# Patient Record
Sex: MALE | Race: WHITE | NOT HISPANIC OR LATINO | Employment: OTHER | URBAN - METROPOLITAN AREA
[De-identification: names, ages, dates, MRNs, and addresses within clinical notes are randomized per-mention and may not be internally consistent; named-entity substitution may affect disease eponyms.]

---

## 2017-03-07 NOTE — TELEPHONE ENCOUNTER
----- Message from Yoselin Chinchilla sent at 3/7/2017  1:21 PM CST -----  Contact: Marcie  Patient's wife is requesting refill Rx [phone]. Patient has three days left of medication. Thanks!

## 2017-03-09 RX ORDER — ROSUVASTATIN CALCIUM 20 MG/1
20 TABLET, COATED ORAL DAILY
Qty: 90 TABLET | Refills: 0 | Status: SHIPPED | OUTPATIENT
Start: 2017-03-09 | End: 2017-05-15 | Stop reason: SDUPTHER

## 2017-05-10 RX ORDER — POTASSIUM CHLORIDE 750 MG/1
10 CAPSULE, EXTENDED RELEASE ORAL DAILY
Qty: 90 CAPSULE | Refills: 0 | Status: SHIPPED | OUTPATIENT
Start: 2017-05-10 | End: 2017-07-25 | Stop reason: SDUPTHER

## 2017-05-15 RX ORDER — ROSUVASTATIN CALCIUM 20 MG/1
20 TABLET, COATED ORAL DAILY
Qty: 90 TABLET | Refills: 0 | Status: SHIPPED | OUTPATIENT
Start: 2017-05-15 | End: 2017-11-09 | Stop reason: SDUPTHER

## 2017-05-23 RX ORDER — METHOTREXATE 2.5 MG/1
TABLET ORAL
Qty: 40 TABLET | Refills: 5 | Status: SHIPPED | OUTPATIENT
Start: 2017-05-23 | End: 2017-11-09 | Stop reason: SDUPTHER

## 2017-06-07 DIAGNOSIS — M19.90 CHRONIC INFLAMMATORY ARTHRITIS: Primary | ICD-10-CM

## 2017-06-07 RX ORDER — PREDNISONE 5 MG/1
TABLET ORAL
Qty: 90 TABLET | Refills: 1 | Status: SHIPPED | OUTPATIENT
Start: 2017-06-07 | End: 2017-11-09 | Stop reason: SDUPTHER

## 2017-06-13 RX ORDER — CLOPIDOGREL BISULFATE 75 MG/1
TABLET ORAL
COMMUNITY
End: 2017-06-14

## 2017-06-13 RX ORDER — NAPROXEN SODIUM 220 MG/1
TABLET, FILM COATED ORAL
COMMUNITY
End: 2019-05-05

## 2017-06-13 RX ORDER — LISINOPRIL 10 MG/1
TABLET ORAL
COMMUNITY
End: 2017-06-14 | Stop reason: CLARIF

## 2017-06-13 RX ORDER — SILDENAFIL CITRATE 20 MG/1
20 TABLET ORAL 3 TIMES DAILY
Status: ON HOLD | COMMUNITY
End: 2019-05-24 | Stop reason: HOSPADM

## 2017-06-13 RX ORDER — GABAPENTIN 300 MG/1
600 CAPSULE ORAL 3 TIMES DAILY
Status: ON HOLD | COMMUNITY
End: 2019-05-10 | Stop reason: SDUPTHER

## 2017-06-13 RX ORDER — OXYCODONE AND ACETAMINOPHEN 7.5; 325 MG/1; MG/1
TABLET ORAL
COMMUNITY
End: 2018-08-15 | Stop reason: ALTCHOICE

## 2017-06-13 RX ORDER — METOPROLOL SUCCINATE 50 MG/1
25 TABLET, EXTENDED RELEASE ORAL DAILY
COMMUNITY
End: 2018-02-08

## 2017-06-13 RX ORDER — PANTOPRAZOLE SODIUM 40 MG/1
TABLET, DELAYED RELEASE ORAL
Status: ON HOLD | COMMUNITY
End: 2018-11-27 | Stop reason: SDUPTHER

## 2017-06-13 RX ORDER — CYCLOBENZAPRINE HCL 10 MG
TABLET ORAL
COMMUNITY
End: 2018-08-15

## 2017-06-13 RX ORDER — FUROSEMIDE 20 MG/1
TABLET ORAL
COMMUNITY
End: 2017-11-09

## 2017-06-13 RX ORDER — FOLIC ACID 1 MG/1
TABLET ORAL
COMMUNITY
Start: 2014-12-29 | End: 2018-06-14 | Stop reason: SDUPTHER

## 2017-06-13 RX ORDER — AMBRISENTAN 5 MG/1
5 TABLET, FILM COATED ORAL DAILY
Status: ON HOLD | COMMUNITY
End: 2019-05-24 | Stop reason: HOSPADM

## 2017-06-13 RX ORDER — AMLODIPINE BESYLATE 2.5 MG/1
TABLET ORAL
COMMUNITY
End: 2017-06-14 | Stop reason: CLARIF

## 2017-06-14 ENCOUNTER — OFFICE VISIT (OUTPATIENT)
Dept: CARDIOLOGY | Facility: CLINIC | Age: 72
End: 2017-06-14
Payer: MEDICARE

## 2017-06-14 ENCOUNTER — OFFICE VISIT (OUTPATIENT)
Dept: RHEUMATOLOGY | Facility: CLINIC | Age: 72
End: 2017-06-14
Payer: MEDICARE

## 2017-06-14 VITALS
SYSTOLIC BLOOD PRESSURE: 158 MMHG | HEART RATE: 87 BPM | DIASTOLIC BLOOD PRESSURE: 72 MMHG | HEIGHT: 68 IN | WEIGHT: 210 LBS | BODY MASS INDEX: 31.83 KG/M2

## 2017-06-14 VITALS
BODY MASS INDEX: 31.73 KG/M2 | WEIGHT: 209.38 LBS | DIASTOLIC BLOOD PRESSURE: 84 MMHG | SYSTOLIC BLOOD PRESSURE: 152 MMHG | HEIGHT: 68 IN

## 2017-06-14 DIAGNOSIS — I10 ESSENTIAL HYPERTENSION: ICD-10-CM

## 2017-06-14 DIAGNOSIS — H40.9 GLAUCOMA, UNSPECIFIED GLAUCOMA, UNSPECIFIED LATERALITY: Chronic | ICD-10-CM

## 2017-06-14 DIAGNOSIS — Z79.02 LONG TERM (CURRENT) USE OF ANTITHROMBOTICS/ANTIPLATELETS: ICD-10-CM

## 2017-06-14 DIAGNOSIS — I34.0 NON-RHEUMATIC MITRAL REGURGITATION: ICD-10-CM

## 2017-06-14 DIAGNOSIS — I25.708 CORONARY ARTERY DISEASE INVOLVING CORONARY BYPASS GRAFT OF NATIVE HEART WITH OTHER FORMS OF ANGINA PECTORIS: ICD-10-CM

## 2017-06-14 DIAGNOSIS — E78.00 HYPERCHOLESTEROLEMIA: ICD-10-CM

## 2017-06-14 DIAGNOSIS — I27.20 PULMONARY HTN: ICD-10-CM

## 2017-06-14 DIAGNOSIS — R06.02 SOB (SHORTNESS OF BREATH): Primary | ICD-10-CM

## 2017-06-14 DIAGNOSIS — E66.9 OBESITY, CLASS I, BMI 30.0-34.9 (SEE ACTUAL BMI): ICD-10-CM

## 2017-06-14 PROBLEM — I25.810 CORONARY ARTERY DISEASE INVOLVING CORONARY BYPASS GRAFT OF NATIVE HEART: Status: ACTIVE | Noted: 2017-06-14

## 2017-06-14 PROCEDURE — 99214 OFFICE O/P EST MOD 30 MIN: CPT | Mod: S$GLB,,, | Performed by: INTERNAL MEDICINE

## 2017-06-14 PROCEDURE — 1159F MED LIST DOCD IN RCRD: CPT | Mod: S$GLB,,, | Performed by: INTERNAL MEDICINE

## 2017-06-14 PROCEDURE — 1126F AMNT PAIN NOTED NONE PRSNT: CPT | Mod: S$GLB,,, | Performed by: INTERNAL MEDICINE

## 2017-06-14 PROCEDURE — 1126F AMNT PAIN NOTED NONE PRSNT: CPT | Mod: ,,, | Performed by: INTERNAL MEDICINE

## 2017-06-14 PROCEDURE — 1159F MED LIST DOCD IN RCRD: CPT | Mod: ,,, | Performed by: INTERNAL MEDICINE

## 2017-06-14 PROCEDURE — 99212 OFFICE O/P EST SF 10 MIN: CPT | Mod: ,,, | Performed by: INTERNAL MEDICINE

## 2017-06-14 RX ORDER — LISINOPRIL 5 MG/1
TABLET ORAL
Qty: 90 TABLET | Refills: 1 | Status: SHIPPED | OUTPATIENT
Start: 2017-06-14 | End: 2017-11-09

## 2017-06-14 RX ORDER — LISINOPRIL 5 MG/1
5 TABLET ORAL DAILY
Qty: 30 TABLET | Refills: 1 | Status: SHIPPED | OUTPATIENT
Start: 2017-06-14 | End: 2017-06-14 | Stop reason: SDUPTHER

## 2017-06-14 NOTE — PROGRESS NOTES
"    The patient was here today for follow-up of his rheumatoid arthritis.    The patient states that his rheumatoid arthritis is doing quite well He has been taking Xeljanz and feels it has been extremely accessible. It has allowed him to function at a much higher level and require less in the way of oral steroids.    The patient is unusually upset today and both he and his wife are tearful. I inquired as to what problem they had an they informed me that the ophthalmologist he had seen told him that he had glaucoma that could not be controlled and that he will be blind in a relatively short period of time. According to the patient, the ophthalmologist, who is indeed an ophthalmologist's, discharge the patient from his practice because "nothing further can be done."    This story seems not only troubling but unlikely. I therefore left the patient and his wife in the examination room and went to see my colleague, Pete Bustillo M.D. to whom I explained the current situation. Dr. Bustillo also felt that the stories seemed quite unusual and that he would be happy to see the patient today to evaluate the problem.     From the standpoint of his arthritis, the patient is in no distress. He needs additional samples of his Xeljanz but otherwise has adequate amounts of other medications.     I have therefore abbreviated this visit and sent the patient directly to ophthalmology for examination and perhaps treatment today.    I will see the patient back in 1 month and at that time I will do a more in detail exam from the standpoint of his arthritis and I will obtain appropriate blood testing.  "

## 2017-06-14 NOTE — PROGRESS NOTES
Subjective:    Patient ID:  Jameel Villeda is a 72 y.o. male who presents for Coronary Artery Disease; Hyperlipidemia; Valvular Heart Disease; Hypertension; and Pulmonary Hypertension      Coronary Artery Disease   Presents for follow-up visit. Symptoms include shortness of breath. Pertinent negatives include no chest pain, dizziness, leg swelling, palpitations or weight gain. Risk factors include hyperlipidemia. The symptoms have been worsening. Compliance with diet is variable. Compliance with exercise is variable. Compliance with medications is good.   Hyperlipidemia   Associated symptoms include shortness of breath. Pertinent negatives include no chest pain or focal weakness.   Hypertension   Associated symptoms include shortness of breath. Pertinent negatives include no blurred vision, chest pain, malaise/fatigue, orthopnea, palpitations or PND.     PT WAS FOLLOWED BY DR WILLINGHAM, AVAILABLE RECORDS REVIEWED, ECHO 2015, MILD MR, MILD PUL HTN ON LAST CATH, WORSENING SOB , E BLAMES ON EYE DROPS, FOLLOWED BY DR ISH FERGUSON, SEE ROS  Past Medical History:   Diagnosis Date    Acute coronary syndrome     Cancer     skin    Coronary artery disease     Dyspnea     Heart murmur     Hyperlipidemia     Hypertension     Myofascial pain     PHT (pulmonary hypertension)     Polymyalgia rheumatica     Rheumatoid arthritis with rheumatoid factor of multiple sites without organ or systems involvement     Right rib fracture     Valvular regurgitation      Past Surgical History:   Procedure Laterality Date    CARDIAC CATHETERIZATION      Cardiac stents      cataracts Bilateral     CERVICAL SPINE SURGERY      CHOLECYSTECTOMY      CORONARY ANGIOPLASTY      CORONARY ARTERY BYPASS GRAFT  2001    knee replacements Bilateral     LUMBAR SPINE SURGERY       Family History   Problem Relation Age of Onset    Heart disease Mother     Heart disease Father      Social History     Social History    Marital status:       Spouse name: N/A    Number of children: N/A    Years of education: N/A     Social History Main Topics    Smoking status: Former Smoker    Smokeless tobacco: Never Used    Alcohol use No    Drug use: No    Sexual activity: Not Asked     Other Topics Concern    None     Social History Narrative    None       Review of patient's allergies indicates:   Allergen Reactions    Lorazepam     Warfarin        Current Outpatient Prescriptions:     ambrisentan (LETAIRIS) 5 MG Tab, Take by mouth., Disp: , Rfl:     aspirin 81 MG Chew, Take by mouth., Disp: , Rfl:     cyclobenzaprine (FLEXERIL) 10 MG tablet, Take by mouth., Disp: , Rfl:     folic acid (FOLVITE) 1 MG tablet, Take by mouth., Disp: , Rfl:     furosemide (LASIX) 20 MG tablet, Take by mouth., Disp: , Rfl:     gabapentin (NEURONTIN) 300 MG capsule, Take by mouth., Disp: , Rfl:     methotrexate 2.5 MG Tab, TAKE 10 TABLETS BY MOUTH ONCE WEEKLY, Disp: 40 tablet, Rfl: 5    metoprolol succinate (TOPROL-XL) 50 MG 24 hr tablet, Take 25 mg by mouth once daily. , Disp: , Rfl:     oxycodone-acetaminophen (PERCOCET) 7.5-325 mg per tablet, Take by mouth., Disp: , Rfl:     pantoprazole (PROTONIX) 40 MG tablet, Take by mouth., Disp: , Rfl:     potassium chloride (MICRO-K) 10 MEQ CpSR, Take 1 capsule (10 mEq total) by mouth once daily., Disp: 90 capsule, Rfl: 0    predniSONE (DELTASONE) 5 MG tablet, TAKE 1 TABLET BY MOUTH THREE TIMES DAILY, Disp: 90 tablet, Rfl: 1    rosuvastatin (CRESTOR) 20 MG tablet, Take 1 tablet (20 mg total) by mouth once daily., Disp: 90 tablet, Rfl: 0    sildenafil (REVATIO) 20 mg Tab, Take by mouth., Disp: , Rfl:     tofacitinib (XELJANZ) 5 mg Tab, Take by mouth., Disp: , Rfl:     amlodipine (NORVASC) 2.5 MG tablet, Take by mouth., Disp: , Rfl:     lisinopril 10 MG tablet, Take by mouth., Disp: , Rfl:     Review of Systems   Constitution: Negative for chills, diaphoresis, weakness, malaise/fatigue, night sweats and weight gain.  "  HENT: Positive for hearing loss. Negative for congestion and nosebleeds.    Eyes: Negative for blurred vision, discharge, double vision and visual disturbance.   Cardiovascular: Positive for dyspnea on exertion. Negative for chest pain, claudication, cyanosis, irregular heartbeat, leg swelling, near-syncope, orthopnea, palpitations, paroxysmal nocturnal dyspnea and syncope.   Respiratory: Positive for shortness of breath. Negative for cough, hemoptysis, sleep disturbances due to breathing, sputum production and wheezing.    Endocrine: Negative for polyuria. Heat intolerance: ALL HIS LIFE.   Hematologic/Lymphatic: Negative for adenopathy and bleeding problem. Bruises/bleeds easily.   Skin: Negative for color change, itching and nail changes.   Musculoskeletal: Positive for arthritis. Negative for back pain and falls. Joint pain: KNEES.   Gastrointestinal: Negative for bloating, abdominal pain, change in bowel habit, dysphagia, flatus, heartburn, hematemesis, jaundice, melena and vomiting.   Genitourinary: Negative for dysuria, flank pain, frequency, hematuria and nocturia.   Neurological: Positive for loss of balance (MILD). Negative for brief paralysis, difficulty with concentration, disturbances in coordination, dizziness, focal weakness, light-headedness, numbness, paresthesias, seizures, sensory change and tremors.   Psychiatric/Behavioral: Negative for altered mental status, depression, memory loss and substance abuse. The patient is not nervous/anxious.    Allergic/Immunologic: Negative for persistent infections.        Objective:      Vitals:    06/14/17 0805   BP: (!) 158/72   Pulse: 87   Weight: 95.3 kg (210 lb)   Height: 5' 8" (1.727 m)   PainSc: 0-No pain     Body mass index is 31.93 kg/m².    Physical Exam   Constitutional: He is oriented to person, place, and time. He appears well-developed and well-nourished. He is active.   OVER WEIGHT   HENT:   Head: Normocephalic and atraumatic.   Mouth/Throat: " Oropharynx is clear and moist and mucous membranes are normal.   Eyes: Conjunctivae and EOM are normal. Pupils are equal, round, and reactive to light.   Neck: Normal range of motion. Neck supple. Normal carotid pulses, no hepatojugular reflux and no JVD present. Carotid bruit is not present. No tracheal deviation, no edema and no erythema present. No thyromegaly present.   Cardiovascular: Normal rate and regular rhythm.   No extrasystoles are present. PMI is not displaced.  Exam reveals no gallop, no distant heart sounds, no friction rub and no midsystolic click.    Murmur heard.  High-pitched holosystolic murmur is present  at the lower left sternal border, apex  Pulses:       Carotid pulses are 2+ on the right side, and 2+ on the left side.       Radial pulses are 2+ on the right side, and 2+ on the left side.        Femoral pulses are 2+ on the right side, and 2+ on the left side.       Popliteal pulses are 2+ on the right side, and 2+ on the left side.        Dorsalis pedis pulses are 2+ on the right side, and 2+ on the left side.        Posterior tibial pulses are 2+ on the right side, and 2+ on the left side.   Pulmonary/Chest: Effort normal and breath sounds normal. No accessory muscle usage. No tachypnea and no bradypnea. No respiratory distress.   Abdominal: Soft. Bowel sounds are normal. He exhibits no distension and no mass. There is no hepatosplenomegaly. There is no tenderness. There is no CVA tenderness.   Musculoskeletal: Normal range of motion. He exhibits no edema or deformity.   Lymphadenopathy:     He has no cervical adenopathy.   Neurological: He is alert and oriented to person, place, and time. He has normal strength. He displays no tremor. No cranial nerve deficit ([place]). He exhibits normal muscle tone. Coordination normal.   Skin: Skin is warm and dry. No cyanosis or erythema. No pallor.   Psychiatric: He has a normal mood and affect. His speech is normal and behavior is normal. Judgment and  thought content normal.               ..    Chemistry    No results found for: NA, K, CL, CO2, BUN, CREATININE, GLU No results found for: CALCIUM, ALKPHOS, AST, ALT, BILITOT         ..No results found for: CHOL  No results found for: HDL  No results found for: LDLCALC  No results found for: TRIG  No results found for: CHOLHDL  ..No results found for: WBC, HGB, HCT, MCV, PLT    Test(s) Reviewed  I have reviewed the following in detail:  [] Stress test   [] Angiography   [] Echocardiogram   [] Labs   [x] Other:       Assessment:         ICD-10-CM ICD-9-CM   1. SOB (shortness of breath) R06.02 786.05   2. Coronary artery disease involving coronary bypass graft of native heart with other forms of angina pectoris I25.708 414.05     413.9   3. Pulmonary HTN I27.2 416.8   4. Essential hypertension I10 401.9   5. Non-rheumatic mitral regurgitation I34.0 424.0   6. Long term (current) use of antithrombotics/antiplatelets Z79.02 V58.63   7. Obesity, Class I, BMI 30.0-34.9 (see actual BMI) E66.9 278.00   8. Hypercholesterolemia E78.00 272.0     Problem List Items Addressed This Visit        Pulmonary    SOB (shortness of breath) - Primary    Pulmonary HTN    Relevant Orders    2D echo with color flow doppler       Cardiac    Coronary artery disease involving coronary bypass graft of native heart    Essential hypertension    Relevant Orders    Comprehensive metabolic panel    Non-rheumatic mitral regurgitation    Relevant Orders    2D echo with color flow doppler       Fluids/Electrolytes/Nutrition/GI    Obesity, Class I, BMI 30.0-34.9 (see actual BMI)    Hypercholesterolemia    Relevant Orders    Comprehensive metabolic panel    Lipid panel       Other    Long term (current) use of antithrombotics/antiplatelets      Other Visit Diagnoses    None.          Plan:         CHECK ECHO, WATCH BP, HAD CATH 2 YEARS AGO, , ISR OD D1, SMALL VESSEL, DECLINES STRESS TEST, , WATCH BP, DIET, WEIGHT LOSS, ALL OTHER CV CLINICALLY STABLE,  STABLE  ANGINA, NO OVERT HF, HF, NO TIA, NO CLINICAL ARRHYTHMIA,CONTINUE CURRENT MEDS, EDUCATION, DIET, EXERCISE  SOB (shortness of breath)    Coronary artery disease involving coronary bypass graft of native heart with other forms of angina pectoris    Pulmonary HTN  -     2D echo with color flow doppler; Future    Essential hypertension  -     Comprehensive metabolic panel; Future; Expected date: 06/14/2017    Non-rheumatic mitral regurgitation  -     2D echo with color flow doppler; Future    Long term (current) use of antithrombotics/antiplatelets    Obesity, Class I, BMI 30.0-34.9 (see actual BMI)    Hypercholesterolemia  -     Comprehensive metabolic panel; Future; Expected date: 06/14/2017  -     Lipid panel; Future; Expected date: 06/14/2017    RTC Low level/low impact aerobic exercise 5x's/wk. Heart healthy diet and risk factor modification.    See labs and med orders.    Aerobic exercise 5x's/wk. Heart healthy diet and risk factor modification.    See labs and med orders.

## 2017-06-16 NOTE — PATIENT INSTRUCTIONS
Discharge Instructions for Angina  You have been diagnosed with a type of chest pain called angina. Angina occurs when not enough oxygen reaches the heart muscle. It is most often felt under your breastbone, in your left shoulder, or down your left arm. The pain may even spread to your jaw or back. Exercise, increased activity, emotional upset, or stress can trigger this pain. With proper treatment and lifestyle changes to reduce risk factors, most people with angina are able to maintain a full and active life.  Managing risk factors  Your healthcare provider will work with you to make lifestyle changes as needed. This can help prevent worsening of coronary artery disease, which is likely the cause of your angina.  Coronary artery disease is a narrowing of the blood vessels that supply oxygen and nutrients to the heart muscle. The blood vessels can also spasm and reduce the oxygen reaching the heart muscle from the narrowing inside of the artery. Managing your risk factors may prevent both of these causes of narrowing of your arteries.  Diet  Your healthcare provider will give you information on dietary changes that you may need to make, based on your situation. Your provider may recommend that you see a registered dietitian for help with diet changes. Try these changes to start:    · Reduce fat and cholesterol intake   · Reduce sodium (salt) intake, especially if you have high blood pressure   · Increase your intake of fresh vegetables and fruits   · Eat lean proteins, such as fish, poultry, and legumes (beans and peas) and eat less red meat and processed meats  · Use low-fat dairy products   · Use vegetable and nut oils in limited amounts   · Limit sweets and processed foods such as chips, cookies, and baked goods  · Limit sodas and high calorie drinks  · Limit greasy and fried foods, or those high in saturated fat  · Limit alcohol intake  Physical activity  Your healthcare provider may recommend that you  increase your physical activity if you have not been as active as possible. This may include moderate to vigorous intensity physical activity for at least 30 to 60 minutes each day for at least 5 to 7 days per week. A few examples of moderate to vigorous intensity physical activity include:   · Walking at a brisk pace, about 3 to 4 miles per hour  · Jogging or running  · Swimming or water aerobics  · Hiking  · Dancing  · Martial arts  · Tennis  · Riding a bike  Don't start or increase your activity level without first seeing your healthcare provider.  Weight management  If you are overweight, your healthcare provider will work with you to lose weight and lower your BMI (body mass index) to a normal or near-normal level. Making diet changes and increasing physical activity can help. A healthy and reasonable goal for weight loss is to lose 10% of your current weight per year.  Smoking  If you smoke, break the smoking habit. Enroll in a stop-smoking program to improve your chances of success. You can also join a support group. Talk to your healthcare provider about nicotine replacement products or medicines to help you quit.  Stress  Learn ways to manage stress to help you deal with stress in your home and work life. Your ability to prioritize your health depends on your mental health and focus. Feeling supported in the rest of your life is key to achieving success with your health.  Managing medicines  · Keep a record of your episodes of chest pain. Take these with you when you see your healthcare provider.  · Take your medicines exactly as directed. Dont skip doses. If you miss a dose, call your healthcare provider right away.  · If you have unwanted side effects from your medicine, tell your healthcare provider right away.  Taking nitroglycerin  · Keep your nitroglycerin with you at all times.  · If youre on nitroglycerin, dont take medicines used to treat erectile dysfunction (such as sildenafil) at all. These  can react with nitroglycerin and cause your blood pressure to drop to a dangerous or even life-threatening level.  · If you use nitroglycerin to prevent angina attacks, follow your healthcare providers instructions for your kind of nitroglycerin (pill, spray, or skin patch).  · If you use nitroglycerin to stop an angina attack, follow these steps:  ¨ Sit down (you may become dizzy).  ¨ Place 1 tablet under your tongue, or between your lip and gum, or between your cheek and gum. Let the tablet dissolve completely; do not chew or swallow the tablet.  ¨ If you use a spray, then spray once on or under your tongue. Do not inhale. Close your mouth. Wait a few seconds before you swallow and don't rinse your mouth for 5 to 10 minutes.  ¨ After taking 1 tablet or spraying once, continue sitting for 5 minutes.  ¨ If the angina goes away completely, rest awhile and follow your provider's orders about returning to your normal routine.  ¨ If the chest pain or pressure continues, CALL 911 immediately. Do NOT delay. You may be having a heart attack (acute myocardial infarction, or AMI)!  ¨ You may be told by your doctor to CALL 911 after taking 2 or 3 tables or sprays of nitroglycerin (spaced 5 minutes apart) and the chest pain or pressure is still present 5 minutes after the last dose. Do not take more than 3 tablets, or spray more than 3 times, within 15 minutes.   When to call your doctor  Call your doctor immediately if you have any of these:  · Severe headache  · Severe dizziness, or fainting  · Nausea or vomiting  · Fast heartbeat (higher than 100 beats per minute)  · Swollen ankles  · Weakness  · Angina attacks that last longer, occur more often, or are more severe than in the past  · Angina that occurs at rest, wakes you up out of sleep, or does not resolve   Date Last Reviewed: 6/1/2016  © 7775-4511 [employer]. [address] [place] [address], [place] [address]. All rights reserved. This information is not intended  as a substitute for professional medical care. Always follow your healthcare professional's instructions.        Weight Management: Getting Started  Healthy bodies come in all shapes and sizes. Not all bodies are made to be thin. For some people, a healthy weight is higher than the average weight listed on weight charts. Your healthcare provider can help you decide on a healthy weight for you.    Reasons to lose weight  Losing weight can help with some health problems, such as high blood pressure, heart disease, diabetes, sleep apnea, and arthritis. You may also feel more energy.  Set your long-term goal  Your goal doesn't even have to be a specific weight. You may decide on a fitness goal (such as being able to walk 10 miles a week), or a health goal (such as lowering your blood pressure). Choose a goal that is measurable and reasonable, so you know when you've reached it. A goal of reaching a BMI of less than 25 is not always reasonable (or possible).   Make an action plan  Habits dont change overnight. Setting your goals too high can leave you feeling discouraged if you cant reach them. Be realistic. Choose one or two small changes you can make now. Set an action plan for how you are going to make these changes. When you can stick to this plan, keep making a few more small changes. Taking small steps will help you stay on the path to success.  Track your progress  Write down your goals. Then, keep a daily record of your progress. Write down what you eat and how active you are. This record lets you look back on how much youve done. It may also help when youre feeling frustrated. Reward yourself for success. Even if you dont reach every goal, give yourself credit for what you do get done.  Get support  Encouragement from others can help make losing weight easier. Ask your family members and friends for support. They may even want to join you. Also look to your healthcare provider, registered dietitian, and   for help. Your local hospital can give you more information about nutrition, exercise, and weight loss.  Date Last Reviewed: 1/31/2016 © 2000-2016 [employer]. [address] [place] [address], [place] [address]. All rights reserved. This information is not intended as a substitute for professional medical care. Always follow your healthcare professional's instructions.        Heart Valve Problems  Your hearts job is to pump blood through your body. That job starts with pumping blood through the heart itself. Inside your heart, blood passes through a series of one-way caputo called valves. If a valve works poorly, not enough blood moves forward. A problem heart valve may not open wide enough, not close tightly enough, or both. In any case, not enough blood is sent to the heart muscle or out to the body.  Symptoms of heart valve problems  You can have a problem valve for decades yet have no symptoms. If you do have symptoms, they may come on so slowly that you barely notice them. In other cases, though, symptoms appear suddenly. You might have one or more of these symptoms:  · Problems breathing when you lie down, exert yourself, or get stressed emotionally  · Pain, pressure, tightness, or numbness in your chest, neck, back, or arms (angina)  · Feeling dizzy, faint, or lightheaded  · Tiredness, especially with activity or as the day goes on  · Waking up at night coughing or short of breath  · A fast, pounding, or irregular heartbeat  · A fluttering feeling in your chest  · Swollen ankles or feet  · Fainting, especially upon standing up or with exertion  Problems opening (stenosis)    When a valve doesnt open all the way, the problem is called stenosis. The leaflets may be stuck together or too stiff to open fully. When the valve doesnt open fully, blood has to flow through a smaller opening. So the heart muscle has to work harder to push the blood through the valve.  Problems closing  (regurgitation)    When a valve doesnt close tightly enough and blood leaks backward through the valve, the problem is called regurgitation or insufficiency. The valve itself may be described as leaky. Leaflets may fit together poorly. Or the structures that support them may be torn. Some blood leaks through the valve back into the chamber it just left. So the heart has to move that blood twice. This can result in heart muscle damage.  Common causes of valve problems  People of any age can have heart valve trouble. You may have been born with a problem valve. Or a valve may have worn out as youve aged. It may not be possible to pinpoint what caused your valve problem. But common causes include:  · Buildup of calcium or scar tissue on a valve  · Rheumatic fever and certain other infections and diseases  · High blood pressure  · Other heart problems, such as coronary artery disease  · Congenital defects of the heart valves      Date Last Reviewed: 6/1/2016  © 6297-7625 [employer]. [address] [place] [address], [place] [address]. All rights reserved. This information is not intended as a substitute for professional medical care. Always follow your healthcare professional's instructions.

## 2017-07-13 ENCOUNTER — OFFICE VISIT (OUTPATIENT)
Dept: RHEUMATOLOGY | Facility: CLINIC | Age: 72
End: 2017-07-13
Payer: MEDICARE

## 2017-07-13 VITALS
HEIGHT: 68 IN | SYSTOLIC BLOOD PRESSURE: 131 MMHG | BODY MASS INDEX: 32.89 KG/M2 | WEIGHT: 217 LBS | DIASTOLIC BLOOD PRESSURE: 74 MMHG

## 2017-07-13 DIAGNOSIS — M19.90 CHRONIC INFLAMMATORY ARTHRITIS: Primary | ICD-10-CM

## 2017-07-13 DIAGNOSIS — Z79.899 ENCOUNTER FOR LONG-TERM (CURRENT) USE OF OTHER MEDICATIONS: ICD-10-CM

## 2017-07-13 DIAGNOSIS — M75.52 SUBACROMIAL BURSITIS OF LEFT SHOULDER JOINT: ICD-10-CM

## 2017-07-13 LAB
ALBUMIN SERPL-MCNC: 4.2 G/DL (ref 3.1–4.7)
ALP SERPL-CCNC: 45 IU/L (ref 40–104)
ALT (SGPT): 41 IU/L (ref 3–33)
AST SERPL-CCNC: 39 IU/L (ref 10–40)
BASOPHILS NFR BLD: 0 K/UL (ref 0–0.2)
BASOPHILS NFR BLD: 0.4 %
BILIRUB SERPL-MCNC: 0.5 MG/DL (ref 0.3–1)
BUN SERPL-MCNC: 23 MG/DL (ref 8–20)
CALCIUM SERPL-MCNC: 9.4 MG/DL (ref 7.7–10.4)
CHLORIDE: 103 MMOL/L (ref 98–110)
CO2 SERPL-SCNC: 22.9 MMOL/L (ref 22.8–31.6)
CREATININE: 1.18 MG/DL (ref 0.6–1.4)
EOSINOPHIL NFR BLD: 0 K/UL (ref 0–0.7)
EOSINOPHIL NFR BLD: 0.4 %
ERYTHROCYTE [DISTWIDTH] IN BLOOD BY AUTOMATED COUNT: 14.7 % (ref 11.7–14.9)
GLUCOSE: 139 MG/DL (ref 70–99)
GRAN #: 6.1 K/UL (ref 1.4–6.5)
GRAN%: 77.9 %
HCT VFR BLD AUTO: 35.5 % (ref 39–55)
HGB BLD-MCNC: 11.4 G/DL (ref 14–16)
IMMATURE GRANS (ABS): 0.2 K/UL (ref 0–1)
IMMATURE GRANULOCYTES: 1.9 %
LYMPH #: 1 K/UL (ref 1.2–3.4)
LYMPH%: 12.5 %
MCH RBC QN AUTO: 33.5 PG (ref 25–35)
MCHC RBC AUTO-ENTMCNC: 32.1 G/DL (ref 31–36)
MCV RBC AUTO: 104.4 FL (ref 80–100)
MONO #: 0.5 K/UL (ref 0.1–0.6)
MONO%: 6.9 %
NUCLEATED RBCS: 0 %
PLATELET # BLD AUTO: 202 K/UL (ref 140–440)
PMV BLD AUTO: 10.6 FL (ref 8.8–12.7)
POTASSIUM SERPL-SCNC: 4.4 MMOL/L (ref 3.5–5)
PROT SERPL-MCNC: 7.1 G/DL (ref 6–8.2)
RBC # BLD AUTO: 3.4 M/UL (ref 4.3–5.9)
SODIUM: 137 MMOL/L (ref 134–144)
WBC # BLD AUTO: 7.8 K/UL (ref 5–10)

## 2017-07-13 PROCEDURE — 1125F AMNT PAIN NOTED PAIN PRSNT: CPT | Mod: ,,, | Performed by: INTERNAL MEDICINE

## 2017-07-13 PROCEDURE — 1159F MED LIST DOCD IN RCRD: CPT | Mod: ,,, | Performed by: INTERNAL MEDICINE

## 2017-07-13 PROCEDURE — 99214 OFFICE O/P EST MOD 30 MIN: CPT | Mod: 25,,, | Performed by: INTERNAL MEDICINE

## 2017-07-13 PROCEDURE — 20610 DRAIN/INJ JOINT/BURSA W/O US: CPT | Mod: ,,, | Performed by: INTERNAL MEDICINE

## 2017-07-13 RX ORDER — FLUCONAZOLE 150 MG/1
TABLET ORAL
COMMUNITY
Start: 2017-06-22 | End: 2017-11-09

## 2017-07-13 RX ORDER — OXYCODONE AND ACETAMINOPHEN 10; 325 MG/1; MG/1
TABLET ORAL
COMMUNITY
Start: 2017-06-30 | End: 2017-11-09

## 2017-07-13 RX ORDER — TRIAMCINOLONE ACETONIDE 40 MG/ML
40 INJECTION, SUSPENSION INTRA-ARTICULAR; INTRAMUSCULAR
Status: DISCONTINUED | OUTPATIENT
Start: 2017-07-13 | End: 2017-07-13 | Stop reason: HOSPADM

## 2017-07-13 RX ORDER — LATANOPROST 50 UG/ML
1 SOLUTION/ DROPS OPHTHALMIC DAILY
COMMUNITY
Start: 2017-06-20 | End: 2019-09-04

## 2017-07-13 RX ORDER — TIZANIDINE 4 MG/1
TABLET ORAL
COMMUNITY
Start: 2017-06-14 | End: 2018-08-15

## 2017-07-13 RX ORDER — DEXAMETHASONE SODIUM PHOSPHATE 4 MG/ML
2 INJECTION, SOLUTION INTRA-ARTICULAR; INTRALESIONAL; INTRAMUSCULAR; INTRAVENOUS; SOFT TISSUE
Status: DISCONTINUED | OUTPATIENT
Start: 2017-07-13 | End: 2017-07-13 | Stop reason: HOSPADM

## 2017-07-13 RX ORDER — FUROSEMIDE 20 MG/1
TABLET ORAL
COMMUNITY
Start: 2017-06-23 | End: 2017-12-26 | Stop reason: SDUPTHER

## 2017-07-13 RX ADMIN — DEXAMETHASONE SODIUM PHOSPHATE 2 MG: 4 INJECTION, SOLUTION INTRA-ARTICULAR; INTRALESIONAL; INTRAMUSCULAR; INTRAVENOUS; SOFT TISSUE at 02:07

## 2017-07-13 RX ADMIN — TRIAMCINOLONE ACETONIDE 40 MG: 40 INJECTION, SUSPENSION INTRA-ARTICULAR; INTRAMUSCULAR at 02:07

## 2017-07-13 NOTE — PROCEDURES
Large Joint Aspiration/Injection  Date/Time: 7/13/2017 2:31 PM  Performed by: CARL IVY  Authorized by: CARL IVY     Consent Done?:  Yes (Verbal)  Indications:  Pain  Procedure site marked: Yes    Timeout: Prior to procedure the correct patient, procedure, and site was verified      Location:  Shoulder  Site:  L subacromial bursa  Prep: Patient was prepped and draped in usual sterile fashion    Ultrasonic Guidance for needle placement: No  Needle size:  23 G  Medications:  40 mg triamcinolone acetonide 40 mg/mL; 2 mg dexamethasone 4 mg/mL  Aspirate amount (ml):  0  Patient tolerance:  Patient tolerated the procedure well with no immediate complications

## 2017-07-13 NOTE — PROGRESS NOTES
[facility] RHEUMATOLOGY           Follow-up visit      Subjective:       Patient ID:   NAME: Jameel Villeda : 1945     72 y.o. male    Referring Doc: No ref. provider found  Other Physicians:    Chief Complaint:  Rheumatoid Arthritis (follow up, ) and Pain (sholder pain)      HPI/Interval History:  His arthritis has been doing relatively well until last weekend when a good bit of dating had to be done in his yard area in now has pain in the left shoulder and cannot raise the arm or sleep on that side. He has requested an injection.          ROS:   GEN: no fever, night sweats or weight loss  SKIN:  no rashes , erythema, bruising, or swelling, no Raynauds, no photosensitivity  HEENT: no HAs, no changes in vision, no mouth ulcers, no sicca symptoms, no scalp tenderness, jaw claudication.  CV: no CP, SOB, PND, HO or orthopnea,no palpitations  PULM: no SOB, cough, hemoptysis, sputum or pleuritic pain  GI: no abdominal pain, nausea, vomiting, constipation, diarrhea, melanotic stools, BRBPR, or hematemesis.no dysphagia  : no hematuria, dysuria  NEURO:no paresthesias, headaches, visual disturbances, muscle weakness  MUSCULOSKELETAL:  Pain in left shoulder.  PSYCH: No insomnia, no significant depression, no anxiety    Medications:    Current Outpatient Prescriptions:     ambrisentan (LETAIRIS) 5 MG Tab, Take by mouth., Disp: , Rfl:     aspirin 81 MG Chew, Take by mouth., Disp: , Rfl:     cyclobenzaprine (FLEXERIL) 10 MG tablet, Take by mouth., Disp: , Rfl:     fluconazole (DIFLUCAN) 150 MG Tab, Take 1 Tablet (150 mg) by mouth daily., Disp: , Rfl:     folic acid (FOLVITE) 1 MG tablet, Take by mouth., Disp: , Rfl:     furosemide (LASIX) 20 MG tablet, Take by mouth., Disp: , Rfl:     furosemide (LASIX) 20 MG tablet, Take 1 Tablet (20 mg) by mouth 2 times daily Take 20 mg by mouth 2 times daily for 7 days., Disp: , Rfl:     gabapentin (NEURONTIN) 300 MG capsule, Take by mouth., Disp: , Rfl:     latanoprost  "0.005 % ophthalmic solution, , Disp: , Rfl:     lisinopril (PRINIVIL,ZESTRIL) 5 MG tablet, TAKE 1 TABLET BY MOUTH EVERY DAY, Disp: 90 tablet, Rfl: 1    methotrexate 2.5 MG Tab, TAKE 10 TABLETS BY MOUTH ONCE WEEKLY, Disp: 40 tablet, Rfl: 5    metoprolol succinate (TOPROL-XL) 50 MG 24 hr tablet, Take 25 mg by mouth once daily. , Disp: , Rfl:     oxycodone-acetaminophen (PERCOCET)  mg per tablet, , Disp: , Rfl:     oxycodone-acetaminophen (PERCOCET) 7.5-325 mg per tablet, Take by mouth., Disp: , Rfl:     pantoprazole (PROTONIX) 40 MG tablet, Take by mouth., Disp: , Rfl:     potassium chloride (MICRO-K) 10 MEQ CpSR, Take 1 capsule (10 mEq total) by mouth once daily., Disp: 90 capsule, Rfl: 0    predniSONE (DELTASONE) 5 MG tablet, TAKE 1 TABLET BY MOUTH THREE TIMES DAILY, Disp: 90 tablet, Rfl: 1    rosuvastatin (CRESTOR) 20 MG tablet, Take 1 tablet (20 mg total) by mouth once daily., Disp: 90 tablet, Rfl: 0    sildenafil (REVATIO) 20 mg Tab, Take by mouth., Disp: , Rfl:     tizanidine (ZANAFLEX) 4 MG tablet, , Disp: , Rfl:     tofacitinib (XELJANZ) 5 mg Tab, Take by mouth., Disp: , Rfl:     fluconazole (DIFLUCAN) 150 MG Tab, , Disp: , Rfl:   FAMILY HISTORY: negative for Connective Tissue Disease        Review of patient's allergies indicates:   Allergen Reactions    Lorazepam     Warfarin              Objective:     Vitals:  Blood pressure 131/74, height 5' 8" (1.727 m), weight 98.4 kg (217 lb).    Physical Examination:   GEN: wn/wd male in no apparent distress; AAOx3  SKIN: no rashes, no lesions, no sclerodactyly, no Raynaud's, no periungual erythema  HEAD: no alopecia, no scalp tenderness, no temporal artery tenderness or induration.  EYES: no pallor, no icterus, PERRLA  ENT:  no thrush, no mucosal dryness or ulcerations, adequate oral hygiene & dentition.  NECK: supple x 6, no masses, no thyromegaly, no lymphadenopathy.  CV:   S1 and S2 regular, no murmurs, gallop or rubs  CHEST: Normal " respiratory effort;  normal breath sounds/no adventitious sounds. No signs of consolidation.  ABD: non-tender and non-distended; soft; normal bowel sounds; no rebound/guarding or tenderness. No hepatosplenomegaly.  Musculoskeletal:  Chronic synovial proliferation is unchanged. Active abduction of the left shoulder is limited to 80. Passive range of motion is 130. The drop arm sign and the impingement sign are both negative.  EXTREM: no clubbing, cyanosis or edema. normal pulses.  NEURO: grossly intact; motor/sensory WNL; AAOx3; no tremors  PSYCH: normal mood, affect and behavior            Labs:   No results found for: WBC, HGB, HCT, MCV, PLTCMP@  No results found for: NA, K, CL, CO2, GLU, BUN, CREATININE, CALCIUM, PROT, ALBUMIN, BILITOT, ALKPHOS, AST, ALT, CPK, CRP, MARSHA, RF, URICACID      Radiology/Diagnostic Studies:    No x-rays are available.    Assessment/Discussion/Plan:   72 y.o. male with  Rheumatoid arthritis and left subacromial bursitis.        PLAN:  The patient requested a steroid injection in the left shoulder which was performed as per the appended procedure note. The patient tolerated the procedure well and had excellent return of pain free range of motion through 110. Routine blood testing was obtained.      RTC: I will see him back in 4 months.      Electronically signed by Milan Bernabe MD

## 2017-07-25 RX ORDER — POTASSIUM CHLORIDE 750 MG/1
CAPSULE, EXTENDED RELEASE ORAL
Qty: 90 CAPSULE | Refills: 0 | Status: SHIPPED | OUTPATIENT
Start: 2017-07-25 | End: 2017-11-12 | Stop reason: SDUPTHER

## 2017-07-28 ENCOUNTER — CLINICAL SUPPORT (OUTPATIENT)
Dept: CARDIOLOGY | Facility: CLINIC | Age: 72
End: 2017-07-28
Payer: MEDICARE

## 2017-07-28 ENCOUNTER — LAB VISIT (OUTPATIENT)
Dept: LAB | Facility: HOSPITAL | Age: 72
End: 2017-07-28
Attending: INTERNAL MEDICINE
Payer: MEDICARE

## 2017-07-28 DIAGNOSIS — I10 ESSENTIAL HYPERTENSION: ICD-10-CM

## 2017-07-28 DIAGNOSIS — I34.0 NON-RHEUMATIC MITRAL REGURGITATION: ICD-10-CM

## 2017-07-28 DIAGNOSIS — E78.00 HYPERCHOLESTEROLEMIA: ICD-10-CM

## 2017-07-28 DIAGNOSIS — R06.02 SOB (SHORTNESS OF BREATH): ICD-10-CM

## 2017-07-28 DIAGNOSIS — I27.20 PULMONARY HTN: ICD-10-CM

## 2017-07-28 LAB
ALBUMIN SERPL BCP-MCNC: 3.9 G/DL
ALP SERPL-CCNC: 52 U/L
ALT SERPL W/O P-5'-P-CCNC: 43 U/L
ANION GAP SERPL CALC-SCNC: 9 MMOL/L
AST SERPL-CCNC: 36 U/L
BILIRUB SERPL-MCNC: 0.6 MG/DL
BNP SERPL-MCNC: 39 PG/ML
BUN SERPL-MCNC: 28 MG/DL
CALCIUM SERPL-MCNC: 10 MG/DL
CHLORIDE SERPL-SCNC: 105 MMOL/L
CHOLEST/HDLC SERPL: 2.6 {RATIO}
CO2 SERPL-SCNC: 23 MMOL/L
CREAT SERPL-MCNC: 1.2 MG/DL
DIASTOLIC DYSFUNCTION: YES
EST. GFR  (AFRICAN AMERICAN): >60 ML/MIN/1.73 M^2
EST. GFR  (NON AFRICAN AMERICAN): >60 ML/MIN/1.73 M^2
ESTIMATED PA SYSTOLIC PRESSURE: 27.21
GLUCOSE SERPL-MCNC: 101 MG/DL
HDL/CHOLESTEROL RATIO: 38.1 %
HDLC SERPL-MCNC: 215 MG/DL
HDLC SERPL-MCNC: 82 MG/DL
LDLC SERPL CALC-MCNC: 106 MG/DL
MITRAL VALVE REGURGITATION: ABNORMAL
NONHDLC SERPL-MCNC: 133 MG/DL
POTASSIUM SERPL-SCNC: 4.8 MMOL/L
PROT SERPL-MCNC: 7.4 G/DL
RETIRED EF AND QEF - SEE NOTES: 55 (ref 55–65)
SODIUM SERPL-SCNC: 137 MMOL/L
TRICUSPID VALVE REGURGITATION: ABNORMAL
TRIGL SERPL-MCNC: 135 MG/DL

## 2017-07-28 PROCEDURE — 93306 TTE W/DOPPLER COMPLETE: CPT | Mod: PBBFAC,PO | Performed by: INTERNAL MEDICINE

## 2017-08-22 DIAGNOSIS — M19.90 CHRONIC INFLAMMATORY ARTHRITIS: ICD-10-CM

## 2017-08-22 RX ORDER — PREDNISONE 5 MG/1
TABLET ORAL
Qty: 90 TABLET | Refills: 0 | Status: SHIPPED | OUTPATIENT
Start: 2017-08-22 | End: 2017-10-08 | Stop reason: SDUPTHER

## 2017-08-28 RX ORDER — ROSUVASTATIN CALCIUM 20 MG/1
TABLET, COATED ORAL
Qty: 90 TABLET | Refills: 0 | Status: SHIPPED | OUTPATIENT
Start: 2017-08-28 | End: 2017-11-28 | Stop reason: SDUPTHER

## 2017-10-08 DIAGNOSIS — M19.90 CHRONIC INFLAMMATORY ARTHRITIS: ICD-10-CM

## 2017-10-08 RX ORDER — PREDNISONE 5 MG/1
TABLET ORAL
Qty: 90 TABLET | Refills: 0 | Status: SHIPPED | OUTPATIENT
Start: 2017-10-08 | End: 2017-11-12 | Stop reason: SDUPTHER

## 2017-11-07 DIAGNOSIS — M19.90 CHRONIC INFLAMMATORY ARTHRITIS: Primary | ICD-10-CM

## 2017-11-07 RX ORDER — METHOTREXATE 2.5 MG/1
TABLET ORAL
Qty: 40 TABLET | Refills: 5 | Status: SHIPPED | OUTPATIENT
Start: 2017-11-07 | End: 2018-02-08

## 2017-11-09 ENCOUNTER — OFFICE VISIT (OUTPATIENT)
Dept: RHEUMATOLOGY | Facility: CLINIC | Age: 72
End: 2017-11-09
Payer: MEDICARE

## 2017-11-09 VITALS
SYSTOLIC BLOOD PRESSURE: 148 MMHG | HEIGHT: 68 IN | BODY MASS INDEX: 32.13 KG/M2 | WEIGHT: 212 LBS | DIASTOLIC BLOOD PRESSURE: 70 MMHG

## 2017-11-09 DIAGNOSIS — M19.90 CHRONIC INFLAMMATORY ARTHRITIS: Primary | ICD-10-CM

## 2017-11-09 DIAGNOSIS — M19.90 OSTEOARTHRITIS, UNSPECIFIED OSTEOARTHRITIS TYPE, UNSPECIFIED SITE: ICD-10-CM

## 2017-11-09 DIAGNOSIS — I27.20 PULMONARY HYPERTENSION: ICD-10-CM

## 2017-11-09 PROCEDURE — 99214 OFFICE O/P EST MOD 30 MIN: CPT | Mod: ,,, | Performed by: INTERNAL MEDICINE

## 2017-11-09 RX ORDER — TRIAMCINOLONE ACETONIDE 40 MG/ML
40 INJECTION, SUSPENSION INTRA-ARTICULAR; INTRAMUSCULAR ONCE
Status: DISCONTINUED | OUTPATIENT
Start: 2017-11-09 | End: 2019-03-06

## 2017-11-09 RX ORDER — KETOROLAC TROMETHAMINE 30 MG/ML
30 INJECTION, SOLUTION INTRAMUSCULAR; INTRAVENOUS
Status: DISCONTINUED | OUTPATIENT
Start: 2017-11-09 | End: 2019-01-02 | Stop reason: HOSPADM

## 2017-11-09 RX ORDER — ALBUTEROL SULFATE 90 UG/1
1 POWDER, METERED RESPIRATORY (INHALATION) EVERY 6 HOURS PRN
COMMUNITY
Start: 2017-10-13

## 2017-11-09 NOTE — PROGRESS NOTES
"      [facility] RHEUMATOLOGY           Follow-up visit      Subjective:       Patient ID:   NAME: Jameel Villeda : 1945     72 y.o. male    Referring Doc: No ref. provider found  Other Physicians:    Chief Complaint:  Chronic Inflammatory Arthritis; Shortness of Breath; and Pain (7/10 across back, pulled a muscle)      HPI/Interval History:   The patient has been having chest discomfort for the last several days. He was evaluated in the emergency room last night. An EKG, a chest x-ray and blood tests were all performed and were deemed normal. He was given a nebulizer treatment and sent home on antibiotics, a muscle relaxant and his inhaler. For whatever reason, he has not been using the inhaler. He's having some difficulty "catching his breath" at this time. He has not noted any change in the character of the chest discomfort. He has had no diaphoresis, no radiating chest pain, no fever, no chills, etc.          ROS:   GEN:    no fever, night sweats or weight loss  SKIN:   no rashes , erythema, bruising, or swelling, no Raynauds, no photosensitivity  HEENT: no HAs, no changes in vision, no mouth ulcers, no sicca symptoms, no scalp tenderness, jaw claudication.  CV:        No left-sided chest pain, no palpitations, no diaphoresis  PULM:  Chest tightness and shortness of breath without cough, hemoptysis or sputum     GI:      no abdominal pain, nausea, vomiting, constipation, diarrhea, melanotic stools, BRBPR, or hematemesis, no dysphagia, no GERD  :     no hematuria, dysuria  NEURO: no paresthesias, headaches, visual disturbances  MUSCULOSKELETAL:  No complaints of muscle or joint pain  PSYCH:   No insomnia, no significant depression, no anxiety    Medications:    Current Outpatient Prescriptions:     ambrisentan (LETAIRIS) 5 MG Tab, Take by mouth., Disp: , Rfl:     aspirin 81 MG Chew, Take by mouth., Disp: , Rfl:     cyclobenzaprine (FLEXERIL) 10 MG tablet, Take by mouth., Disp: , Rfl:     folic acid " "(FOLVITE) 1 MG tablet, Take by mouth., Disp: , Rfl:     furosemide (LASIX) 20 MG tablet, Take 1 Tablet (20 mg) by mouth 2 times daily Take 20 mg by mouth 2 times daily for 7 days., Disp: , Rfl:     gabapentin (NEURONTIN) 300 MG capsule, Take by mouth., Disp: , Rfl:     latanoprost 0.005 % ophthalmic solution, , Disp: , Rfl:     methotrexate 2.5 MG Tab, TAKE 10 TABLETS BY MOUTH WEEKLY, Disp: 40 tablet, Rfl: 5    metoprolol succinate (TOPROL-XL) 50 MG 24 hr tablet, Take 25 mg by mouth once daily. , Disp: , Rfl:     oxycodone-acetaminophen (PERCOCET) 7.5-325 mg per tablet, Take by mouth., Disp: , Rfl:     pantoprazole (PROTONIX) 40 MG tablet, Take by mouth., Disp: , Rfl:     potassium chloride (MICRO-K) 10 MEQ CpSR, TAKE 1 CAPSULE(10 MEQ) BY MOUTH EVERY DAY, Disp: 90 capsule, Rfl: 0    predniSONE (DELTASONE) 5 MG tablet, TAKE 1 TABLET BY MOUTH THREE TIMES DAILY, Disp: 90 tablet, Rfl: 0    PROAIR RESPICLICK 90 mcg/actuation AePB, , Disp: , Rfl:     rosuvastatin (CRESTOR) 20 MG tablet, TAKE 1 TABLET(20 MG) BY MOUTH EVERY DAY, Disp: 90 tablet, Rfl: 0    sildenafil (REVATIO) 20 mg Tab, Take by mouth., Disp: , Rfl:     tizanidine (ZANAFLEX) 4 MG tablet, , Disp: , Rfl:     tofacitinib (XELJANZ) 5 mg Tab, Take by mouth., Disp: , Rfl:       FAMILY HISTORY: negative for Connective Tissue Disease        Review of patient's allergies indicates:   Allergen Reactions    Lorazepam     Warfarin              Objective:     Vitals:  Blood pressure (!) 148/70, height 5' 8" (1.727 m), weight 96.2 kg (212 lb).    Physical Examination:   GEN: wn/wd male in no apparent distress  SKIN: no rashes, no lesions, no sclerodactyly, no Raynaud's, no periungual erythema  HEAD: no alopecia, no scalp tenderness, no temporal artery tenderness or induration.  EYES: no pallor, no icterus, PERRLA  ENT:  no thrush, no mucosal dryness or ulcerations, adequate oral hygiene & dentition.  NECK: supple x 6, no masses, no thyromegaly, no " lymphadenopathy.  CV:   S1 and S2 regular, no murmurs, gallop or rubs  CHEST:   Decreased lung excursion noted bilaterally with expiratory wheezes, no evidence of consolidation  ABD: non-tender and non-distended; soft; normal bowel sounds; no rebound/guarding or tenderness. No hepatosplenomegaly.  Musculoskeletal:  No evidence of active inflammation  EXTREM: no clubbing, cyanosis or edema. normal pulses.  NEURO: grossly intact; motor/sensory WNL; AAOx3; no tremors  PSYCH:  normal mood, affect and behavior            Labs:   Lab Results   Component Value Date    WBC 7.8 07/13/2017    HGB 11.4 (L) 07/13/2017    HCT 35.5 (L) 07/13/2017    .4 (H) 07/13/2017     07/13/2017   CMP@  Sodium   Date Value Ref Range Status   07/28/2017 137 136 - 145 mmol/L Final   07/13/2017 137 134 - 144 mmol/L      Potassium   Date Value Ref Range Status   07/28/2017 4.8 3.5 - 5.1 mmol/L Final     Chloride   Date Value Ref Range Status   07/28/2017 105 95 - 110 mmol/L Final   07/13/2017 103 98 - 110 mmol/L      CO2   Date Value Ref Range Status   07/28/2017 23 23 - 29 mmol/L Final     Glucose   Date Value Ref Range Status   07/28/2017 101 70 - 110 mg/dL Final   07/13/2017 139 (H) 70 - 99 mg/dL      BUN, Bld   Date Value Ref Range Status   07/28/2017 28 (H) 8 - 23 mg/dL Final     Creatinine   Date Value Ref Range Status   07/28/2017 1.2 0.5 - 1.4 mg/dL Final   07/13/2017 1.18 0.60 - 1.40 mg/dL      Calcium   Date Value Ref Range Status   07/28/2017 10.0 8.7 - 10.5 mg/dL Final     Total Protein   Date Value Ref Range Status   07/28/2017 7.4 6.0 - 8.4 g/dL Final     Albumin   Date Value Ref Range Status   07/28/2017 3.9 3.5 - 5.2 g/dL Final   07/13/2017 4.2 3.1 - 4.7 g/dL      Total Bilirubin   Date Value Ref Range Status   07/28/2017 0.6 0.1 - 1.0 mg/dL Final     Comment:     For infants and newborns, interpretation of results should be based  on gestational age, weight and in agreement with clinical  observations.  Premature  Infant recommended reference ranges:  Up to 24 hours.............<8.0 mg/dL  Up to 48 hours............<12.0 mg/dL  3-5 days..................<15.0 mg/dL  6-29 days.................<15.0 mg/dL       Alkaline Phosphatase   Date Value Ref Range Status   07/28/2017 52 (L) 55 - 135 U/L Final     AST   Date Value Ref Range Status   07/28/2017 36 10 - 40 U/L Final     ALT   Date Value Ref Range Status   07/28/2017 43 10 - 44 U/L Final         Radiology/Diagnostic Studies:    -    Assessment/Discussion/Plan:   72 y.o. male with rheumatoid arthritis-adequately controlled with methotrexate and Xeljanz  (2) acute exacerbation of underlying COPD and pulmonary hypertension    PLAN:   I will continue his Xalatan but temporarily discontinue methotrexate due to the  potential for pulmonary toxicity there. I have given him an injection of Toradol and Kenalog.   I have discussed with them warning signs of respiratory failure and have instructed them to call 911 if any of those things occur.  They have otherwise been instructed to see Dr. Serjio Lakhani tomorrow.  I did not perform blood tests today because he had labs done last night.      RTC:  I will see him back in 3-4 months.      Electronically signed by Milan Bernabe MD

## 2017-11-12 DIAGNOSIS — M19.90 CHRONIC INFLAMMATORY ARTHRITIS: ICD-10-CM

## 2017-11-13 RX ORDER — PREDNISONE 5 MG/1
TABLET ORAL
Qty: 90 TABLET | Refills: 1 | Status: SHIPPED | OUTPATIENT
Start: 2017-11-13 | End: 2018-01-08 | Stop reason: SDUPTHER

## 2017-11-13 RX ORDER — POTASSIUM CHLORIDE 750 MG/1
CAPSULE, EXTENDED RELEASE ORAL
Qty: 90 CAPSULE | Refills: 0 | Status: SHIPPED | OUTPATIENT
Start: 2017-11-13 | End: 2018-02-11 | Stop reason: SDUPTHER

## 2017-11-13 NOTE — TELEPHONE ENCOUNTER
I spoke to Mrs. Villeda. She days Mr. Villeda is doing a little better. He did not take his MTX as directed. He was unable to see Dr. Lakhani last week. They do not take walkins. He will go by there today while he is out and make an appt. He is seeing his pain management DrCarrie Ramirez am.

## 2017-11-28 RX ORDER — LISINOPRIL 5 MG/1
TABLET ORAL
Qty: 90 TABLET | Refills: 0 | Status: SHIPPED | OUTPATIENT
Start: 2017-11-28 | End: 2018-02-27 | Stop reason: SDUPTHER

## 2017-11-28 RX ORDER — ROSUVASTATIN CALCIUM 20 MG/1
TABLET, COATED ORAL
Qty: 90 TABLET | Refills: 0 | Status: SHIPPED | OUTPATIENT
Start: 2017-11-28 | End: 2018-02-27 | Stop reason: SDUPTHER

## 2017-11-30 RX ORDER — METOPROLOL SUCCINATE 25 MG/1
TABLET, EXTENDED RELEASE ORAL
Qty: 30 TABLET | Refills: 2 | Status: SHIPPED | OUTPATIENT
Start: 2017-11-30 | End: 2018-06-09 | Stop reason: SDUPTHER

## 2017-12-18 ENCOUNTER — TELEPHONE (OUTPATIENT)
Dept: RHEUMATOLOGY | Facility: CLINIC | Age: 72
End: 2017-12-18

## 2017-12-18 NOTE — TELEPHONE ENCOUNTER
I will want a clearance from his pulmonologist, Dr. Serjio Lakhani, before he may restart the methotrexate.

## 2017-12-18 NOTE — TELEPHONE ENCOUNTER
Mr. Villeda's wife reports the patients breathing is better. Asks can he restart his MTX. If yes how much.

## 2017-12-26 DIAGNOSIS — I27.20 PULMONARY HYPERTENSION: Primary | ICD-10-CM

## 2017-12-26 RX ORDER — FUROSEMIDE 20 MG/1
TABLET ORAL
Qty: 180 TABLET | Refills: 1 | Status: SHIPPED | OUTPATIENT
Start: 2017-12-26 | End: 2018-08-16 | Stop reason: SDUPTHER

## 2018-01-08 DIAGNOSIS — M19.90 CHRONIC INFLAMMATORY ARTHRITIS: ICD-10-CM

## 2018-01-08 RX ORDER — PREDNISONE 5 MG/1
TABLET ORAL
Qty: 90 TABLET | Refills: 1 | Status: SHIPPED | OUTPATIENT
Start: 2018-01-08 | End: 2018-03-14 | Stop reason: SDUPTHER

## 2018-02-08 ENCOUNTER — OFFICE VISIT (OUTPATIENT)
Dept: RHEUMATOLOGY | Facility: CLINIC | Age: 73
End: 2018-02-08
Payer: MEDICARE

## 2018-02-08 VITALS
HEIGHT: 67 IN | SYSTOLIC BLOOD PRESSURE: 154 MMHG | WEIGHT: 203 LBS | BODY MASS INDEX: 31.86 KG/M2 | DIASTOLIC BLOOD PRESSURE: 98 MMHG

## 2018-02-08 DIAGNOSIS — M19.90 CHRONIC INFLAMMATORY ARTHRITIS: Primary | ICD-10-CM

## 2018-02-08 DIAGNOSIS — Z79.899 ENCOUNTER FOR LONG-TERM (CURRENT) DRUG USE: ICD-10-CM

## 2018-02-08 DIAGNOSIS — M19.90 OSTEOARTHRITIS, UNSPECIFIED OSTEOARTHRITIS TYPE, UNSPECIFIED SITE: ICD-10-CM

## 2018-02-08 DIAGNOSIS — I27.20 PULMONARY HYPERTENSION: ICD-10-CM

## 2018-02-08 LAB
ALBUMIN SERPL-MCNC: 4.4 G/DL (ref 3.1–4.7)
ALP SERPL-CCNC: 58 IU/L (ref 40–104)
ALT (SGPT): 41 IU/L (ref 3–33)
AST SERPL-CCNC: 41 IU/L (ref 10–40)
BASOPHILS NFR BLD: 0 K/UL (ref 0–0.2)
BASOPHILS NFR BLD: 0.3 %
BILIRUB SERPL-MCNC: 0.5 MG/DL (ref 0.3–1)
BUN SERPL-MCNC: 28 MG/DL (ref 8–20)
CALCIUM SERPL-MCNC: 10 MG/DL (ref 7.7–10.4)
CHLORIDE: 103 MMOL/L (ref 98–110)
CO2 SERPL-SCNC: 23.8 MMOL/L (ref 22.8–31.6)
CREATININE: 1.16 MG/DL (ref 0.6–1.4)
CRP SERPL-MCNC: 0.15 MG/DL (ref 0–1.4)
EOSINOPHIL NFR BLD: 0 K/UL (ref 0–0.7)
EOSINOPHIL NFR BLD: 0.1 %
ERYTHROCYTE [DISTWIDTH] IN BLOOD BY AUTOMATED COUNT: 15.3 % (ref 11.7–14.9)
GLUCOSE: 98 MG/DL (ref 70–99)
GRAN #: 9.1 K/UL (ref 1.4–6.5)
GRAN%: 81.8 %
HCT VFR BLD AUTO: 41.2 % (ref 39–55)
HGB BLD-MCNC: 12.7 G/DL (ref 14–16)
IMMATURE GRANS (ABS): 0.2 K/UL (ref 0–1)
IMMATURE GRANULOCYTES: 1.4 %
LYMPH #: 1.1 K/UL (ref 1.2–3.4)
LYMPH%: 9.5 %
MCH RBC QN AUTO: 30.6 PG (ref 25–35)
MCHC RBC AUTO-ENTMCNC: 30.8 G/DL (ref 31–36)
MCV RBC AUTO: 99.3 FL (ref 80–100)
MONO #: 0.8 K/UL (ref 0.1–0.6)
MONO%: 6.9 %
NUCLEATED RBCS: 0 %
PLATELET # BLD AUTO: 309 K/UL (ref 140–440)
PMV BLD AUTO: 10.3 FL (ref 8.8–12.7)
POTASSIUM SERPL-SCNC: 4.5 MMOL/L (ref 3.5–5)
PROT SERPL-MCNC: 8.2 G/DL (ref 6–8.2)
RBC # BLD AUTO: 4.15 M/UL (ref 4.3–5.9)
SODIUM: 138 MMOL/L (ref 134–144)
WBC # BLD AUTO: 11.1 K/UL (ref 5–10)

## 2018-02-08 PROCEDURE — 1125F AMNT PAIN NOTED PAIN PRSNT: CPT | Mod: ,,, | Performed by: INTERNAL MEDICINE

## 2018-02-08 PROCEDURE — 99214 OFFICE O/P EST MOD 30 MIN: CPT | Mod: ,,, | Performed by: INTERNAL MEDICINE

## 2018-02-08 PROCEDURE — 1159F MED LIST DOCD IN RCRD: CPT | Mod: ,,, | Performed by: INTERNAL MEDICINE

## 2018-02-08 RX ORDER — FLUTICASONE FUROATE AND VILANTEROL TRIFENATATE 100; 25 UG/1; UG/1
POWDER RESPIRATORY (INHALATION)
COMMUNITY
Start: 2017-11-27 | End: 2019-03-06

## 2018-02-08 RX ORDER — OXYCODONE AND ACETAMINOPHEN 10; 325 MG/1; MG/1
TABLET ORAL
COMMUNITY
Start: 2018-01-15 | End: 2018-08-15 | Stop reason: ALTCHOICE

## 2018-02-08 NOTE — PROGRESS NOTES
[facility] RHEUMATOLOGY           Follow-up visit      Subjective:       Patient ID:   NAME: Jameel Villeda : 1945     72 y.o. male    Referring Doc: No ref. provider found  Other Physicians:    Chief Complaint:  Shortness of Breath      HPI/Interval History:   The patient has had no problems with his arthritis. He is still on Xeljanz, using samples we have provided him.   He is experiencing progressive shortness of breath with tightness in his lungs. He is being treated by Dr. Serjio Lakhani with medications to decrease his pulmonary hypertension.        ROS:   GEN:    no fever, night sweats or weight loss  SKIN:   no rashes , erythema, bruising, or swelling, no Raynauds, no photosensitivity  HEENT: no HAs, no changes in vision, no mouth ulcers, no sicca symptoms, no scalp tenderness, jaw claudication.  CV:      ++ no CP, ++ SOB, + PND, +++ HO  PULM: no cough, hemoptysis, or sputum, ++ pleuritic pain  GI:      no abdominal pain, nausea, vomiting, constipation, diarrhea, melanotic stools, BRBPR, or hematemesis, no dysphagia, no GERD  :     no hematuria, dysuria  NEURO: no paresthesias, headaches, visual disturbances  MUSCULOSKELETAL:  No evidence of active inflammatory arthritis  PSYCH:   No insomnia, no significant depression, no anxiety    Medications:    Current Outpatient Prescriptions:     ambrisentan (LETAIRIS) 5 MG Tab, Take by mouth., Disp: , Rfl:     aspirin 81 MG Chew, Take by mouth., Disp: , Rfl:     BREO ELLIPTA 100-25 mcg/dose diskus inhaler, , Disp: , Rfl:     cyclobenzaprine (FLEXERIL) 10 MG tablet, Take by mouth., Disp: , Rfl:     folic acid (FOLVITE) 1 MG tablet, Take by mouth., Disp: , Rfl:     furosemide (LASIX) 20 MG tablet, Take 1 Tablet (20 mg) by mouth 2 times daily, Disp: 180 tablet, Rfl: 1    gabapentin (NEURONTIN) 300 MG capsule, Take by mouth., Disp: , Rfl:     latanoprost 0.005 % ophthalmic solution, , Disp: , Rfl:     lisinopril (PRINIVIL,ZESTRIL) 5 MG tablet, TAKE 1  "TABLET BY MOUTH EVERY DAY, Disp: 90 tablet, Rfl: 0    metoprolol succinate (TOPROL-XL) 25 MG 24 hr tablet, TAKE 1/2 TABLET BY MOUTH EVERY DAY FOR 30 DAYS, Disp: 30 tablet, Rfl: 2    oxyCODONE-acetaminophen (PERCOCET)  mg per tablet, , Disp: , Rfl:     oxycodone-acetaminophen (PERCOCET) 7.5-325 mg per tablet, Take by mouth., Disp: , Rfl:     pantoprazole (PROTONIX) 40 MG tablet, Take by mouth., Disp: , Rfl:     potassium chloride (MICRO-K) 10 MEQ CpSR, TAKE 1 CAPSULE(10 MEQ) BY MOUTH EVERY DAY, Disp: 90 capsule, Rfl: 0    predniSONE (DELTASONE) 5 MG tablet, TAKE 1 TABLET BY MOUTH THREE TIMES DAILY, Disp: 90 tablet, Rfl: 1    PROAIR RESPICLICK 90 mcg/actuation AePB, , Disp: , Rfl:     rosuvastatin (CRESTOR) 20 MG tablet, TAKE 1 TABLET(20 MG) BY MOUTH EVERY DAY, Disp: 90 tablet, Rfl: 0    sildenafil (REVATIO) 20 mg Tab, Take by mouth., Disp: , Rfl:     tizanidine (ZANAFLEX) 4 MG tablet, , Disp: , Rfl:     tofacitinib (XELJANZ) 5 mg Tab, Take by mouth., Disp: , Rfl:     Current Facility-Administered Medications:     ketorolac injection 30 mg, 30 mg, Intramuscular, 1 time in Clinic/HOD, Milan Bernbae MD    triamcinolone acetonide injection 40 mg, 40 mg, Intramuscular, Once, Milan Bernabe MD      FAMILY HISTORY: negative for Connective Tissue Disease        Review of patient's allergies indicates:   Allergen Reactions    Lorazepam     Warfarin              Objective:     Vitals:  Blood pressure (!) 154/98, height 5' 7" (1.702 m), weight 92.1 kg (203 lb).    Physical Examination:   GEN: wn/wd male in no apparent distress  SKIN: no rashes, no lesions, no sclerodactyly, no Raynaud's, no periungual erythema  HEAD: no alopecia, no scalp tenderness, no temporal artery tenderness or induration.  EYES: no pallor, no icterus, PERRLA  ENT:  no thrush, no mucosal dryness or ulcerations, adequate oral hygiene & dentition.  NECK: supple x 6, no masses, no thyromegaly, no lymphadenopathy.  CV:   S1 and S2 " regular, no murmurs, gallop or rubs  CHEST: Normal respiratory effort;  normal breath sounds/no adventitious sounds. No signs of consolidation.  ABD: non-tender and non-distended; soft; normal bowel sounds; no rebound/guarding or tenderness. No hepatosplenomegaly.  Musculoskeletal:  No evidence of active arthritis, no progression of deformities  EXTREM: no clubbing, cyanosis or edema. normal pulses.  NEURO: grossly intact; motor/sensory WNL; AAOx3; no tremors  PSYCH:  normal mood, affect and behavior            Labs:   Lab Results   Component Value Date    WBC 7.8 07/13/2017    HGB 11.4 (L) 07/13/2017    HCT 35.5 (L) 07/13/2017    .4 (H) 07/13/2017     07/13/2017   CMP@  Sodium   Date Value Ref Range Status   07/28/2017 137 136 - 145 mmol/L Final   07/13/2017 137 134 - 144 mmol/L      Potassium   Date Value Ref Range Status   07/28/2017 4.8 3.5 - 5.1 mmol/L Final     Chloride   Date Value Ref Range Status   07/28/2017 105 95 - 110 mmol/L Final   07/13/2017 103 98 - 110 mmol/L      CO2   Date Value Ref Range Status   07/28/2017 23 23 - 29 mmol/L Final     Glucose   Date Value Ref Range Status   07/28/2017 101 70 - 110 mg/dL Final   07/13/2017 139 (H) 70 - 99 mg/dL      BUN, Bld   Date Value Ref Range Status   07/28/2017 28 (H) 8 - 23 mg/dL Final     Creatinine   Date Value Ref Range Status   07/28/2017 1.2 0.5 - 1.4 mg/dL Final   07/13/2017 1.18 0.60 - 1.40 mg/dL      Calcium   Date Value Ref Range Status   07/28/2017 10.0 8.7 - 10.5 mg/dL Final     Total Protein   Date Value Ref Range Status   07/28/2017 7.4 6.0 - 8.4 g/dL Final     Albumin   Date Value Ref Range Status   07/28/2017 3.9 3.5 - 5.2 g/dL Final   07/13/2017 4.2 3.1 - 4.7 g/dL      Total Bilirubin   Date Value Ref Range Status   07/28/2017 0.6 0.1 - 1.0 mg/dL Final     Comment:     For infants and newborns, interpretation of results should be based  on gestational age, weight and in agreement with clinical  observations.  Premature Infant  recommended reference ranges:  Up to 24 hours.............<8.0 mg/dL  Up to 48 hours............<12.0 mg/dL  3-5 days..................<15.0 mg/dL  6-29 days.................<15.0 mg/dL       Alkaline Phosphatase   Date Value Ref Range Status   07/28/2017 52 (L) 55 - 135 U/L Final     AST   Date Value Ref Range Status   07/28/2017 36 10 - 40 U/L Final     ALT   Date Value Ref Range Status   07/28/2017 43 10 - 44 U/L Final         Radiology/Diagnostic Studies:    none    Assessment/Discussion/Plan:   72 y.o. male with rheumatoid arthritis, responding well to Xeljanz   (2) Pulmonary HTN- symptomatic    PLAN:  I would like him to continue his rheumatoid medication unchanged. Unfortunately, we were supplying him with samples of his medication. He has 1 month left on samples we had provided previously. He will contact the  and my nurse, Kassandra Hamilton LPN, will assist him in trying to get the drug.  Routine blood testing was obtained today. I suggested that they purchase and oximeter at a local drug store. I think that this will be helpful in allowing him to participate in and help to determine the course of his care.    RTC:  I will see him back in 3-4 months      Electronically signed by Milan Bernabe MD

## 2018-02-12 RX ORDER — POTASSIUM CHLORIDE 750 MG/1
CAPSULE, EXTENDED RELEASE ORAL
Qty: 90 CAPSULE | Refills: 0 | Status: SHIPPED | OUTPATIENT
Start: 2018-02-12 | End: 2018-06-11 | Stop reason: SDUPTHER

## 2018-02-27 RX ORDER — LISINOPRIL 5 MG/1
TABLET ORAL
Qty: 90 TABLET | Refills: 0 | Status: SHIPPED | OUTPATIENT
Start: 2018-02-27 | End: 2018-06-18 | Stop reason: SDUPTHER

## 2018-02-27 RX ORDER — ROSUVASTATIN CALCIUM 20 MG/1
TABLET, COATED ORAL
Qty: 90 TABLET | Refills: 0 | Status: SHIPPED | OUTPATIENT
Start: 2018-02-27 | End: 2018-06-18 | Stop reason: SDUPTHER

## 2018-03-14 DIAGNOSIS — M19.90 CHRONIC INFLAMMATORY ARTHRITIS: ICD-10-CM

## 2018-03-14 RX ORDER — PREDNISONE 5 MG/1
TABLET ORAL
Qty: 90 TABLET | Refills: 5 | Status: SHIPPED | OUTPATIENT
Start: 2018-03-14 | End: 2018-04-02 | Stop reason: SDUPTHER

## 2018-03-14 NOTE — TELEPHONE ENCOUNTER
He has been approved to receive it thru [employer] free for a year. He  has received the med from them.

## 2018-04-02 ENCOUNTER — TELEPHONE (OUTPATIENT)
Dept: RHEUMATOLOGY | Facility: CLINIC | Age: 73
End: 2018-04-02

## 2018-04-02 DIAGNOSIS — M19.90 CHRONIC INFLAMMATORY ARTHRITIS: ICD-10-CM

## 2018-04-02 RX ORDER — PREDNISONE 5 MG/1
5 TABLET ORAL 3 TIMES DAILY
Qty: 90 TABLET | Refills: 5 | Status: SHIPPED | OUTPATIENT
Start: 2018-04-02 | End: 2018-09-24 | Stop reason: SDUPTHER

## 2018-04-02 NOTE — TELEPHONE ENCOUNTER
Pt needs prednisone rx sent to [facility] in [place].  He ran out due to increase from 3 tabs a day to 4 tabs a day as instructed to do on last office visit

## 2018-06-09 RX ORDER — METOPROLOL SUCCINATE 25 MG/1
TABLET, EXTENDED RELEASE ORAL
Qty: 30 TABLET | Refills: 0 | Status: SHIPPED | OUTPATIENT
Start: 2018-06-09 | End: 2018-08-10 | Stop reason: SDUPTHER

## 2018-06-11 RX ORDER — POTASSIUM CHLORIDE 750 MG/1
TABLET, EXTENDED RELEASE ORAL
Qty: 90 TABLET | Refills: 0 | OUTPATIENT
Start: 2018-06-11

## 2018-06-11 RX ORDER — POTASSIUM CHLORIDE 750 MG/1
CAPSULE, EXTENDED RELEASE ORAL
Qty: 30 CAPSULE | Refills: 0 | Status: SHIPPED | OUTPATIENT
Start: 2018-06-11 | End: 2018-07-09 | Stop reason: SDUPTHER

## 2018-06-14 ENCOUNTER — OFFICE VISIT (OUTPATIENT)
Dept: RHEUMATOLOGY | Facility: CLINIC | Age: 73
End: 2018-06-14
Payer: MEDICARE

## 2018-06-14 VITALS
SYSTOLIC BLOOD PRESSURE: 124 MMHG | WEIGHT: 191.63 LBS | DIASTOLIC BLOOD PRESSURE: 66 MMHG | BODY MASS INDEX: 30.01 KG/M2

## 2018-06-14 DIAGNOSIS — I27.20 PULMONARY HYPERTENSION: ICD-10-CM

## 2018-06-14 DIAGNOSIS — M19.90 CHRONIC INFLAMMATORY ARTHRITIS: Primary | ICD-10-CM

## 2018-06-14 DIAGNOSIS — Z79.631 METHOTREXATE, LONG TERM, CURRENT USE: ICD-10-CM

## 2018-06-14 DIAGNOSIS — G47.62 NOCTURNAL LEG CRAMPS: ICD-10-CM

## 2018-06-14 DIAGNOSIS — Z79.899 ENCOUNTER FOR LONG-TERM (CURRENT) DRUG USE: ICD-10-CM

## 2018-06-14 LAB
ALBUMIN SERPL-MCNC: 4.2 G/DL (ref 3.1–4.7)
ALP SERPL-CCNC: 52 IU/L (ref 40–104)
ALT (SGPT): 31 IU/L (ref 3–33)
AST SERPL-CCNC: 40 IU/L (ref 10–40)
BASOPHILS NFR BLD: 0 K/UL (ref 0–0.2)
BASOPHILS NFR BLD: 0.1 %
BILIRUB SERPL-MCNC: 0.5 MG/DL (ref 0.3–1)
BUN SERPL-MCNC: 33 MG/DL (ref 8–20)
CALCIUM SERPL-MCNC: 9.4 MG/DL (ref 7.7–10.4)
CHLORIDE: 106 MMOL/L (ref 98–110)
CO2 SERPL-SCNC: 23.3 MMOL/L (ref 22.8–31.6)
CREATININE: 1.12 MG/DL (ref 0.6–1.4)
CRP SERPL-MCNC: 0.04 MG/DL (ref 0–1.4)
EOSINOPHIL NFR BLD: 0 K/UL (ref 0–0.7)
EOSINOPHIL NFR BLD: 0.2 %
ERYTHROCYTE [DISTWIDTH] IN BLOOD BY AUTOMATED COUNT: 15.2 % (ref 11.7–14.9)
GLUCOSE: 207 MG/DL (ref 70–99)
GRAN #: 8 K/UL (ref 1.4–6.5)
GRAN%: 79.9 %
HCT VFR BLD AUTO: 33.3 % (ref 39–55)
HGB BLD-MCNC: 10.2 G/DL (ref 14–16)
IMMATURE GRANS (ABS): 0.1 K/UL (ref 0–1)
IMMATURE GRANULOCYTES: 1 %
LYMPH #: 1.4 K/UL (ref 1.2–3.4)
LYMPH%: 14.2 %
MCH RBC QN AUTO: 31 PG (ref 25–35)
MCHC RBC AUTO-ENTMCNC: 30.6 G/DL (ref 31–36)
MCV RBC AUTO: 101.2 FL (ref 80–100)
MONO #: 0.5 K/UL (ref 0.1–0.6)
MONO%: 4.6 %
NUCLEATED RBCS: 0 %
PLATELET # BLD AUTO: 267 K/UL (ref 140–440)
PMV BLD AUTO: 10.4 FL (ref 8.8–12.7)
POTASSIUM SERPL-SCNC: 4.1 MMOL/L (ref 3.5–5)
PROT SERPL-MCNC: 7.1 G/DL (ref 6–8.2)
RBC # BLD AUTO: 3.29 M/UL (ref 4.3–5.9)
SODIUM: 138 MMOL/L (ref 134–144)
WBC # BLD AUTO: 10 K/UL (ref 5–10)

## 2018-06-14 PROCEDURE — 99214 OFFICE O/P EST MOD 30 MIN: CPT | Mod: ,,, | Performed by: INTERNAL MEDICINE

## 2018-06-14 RX ORDER — QUININE SULFATE 324 MG/1
324 CAPSULE ORAL NIGHTLY
Qty: 30 CAPSULE | Refills: 11 | Status: SHIPPED | OUTPATIENT
Start: 2018-06-14 | End: 2018-08-15

## 2018-06-14 RX ORDER — METHOTREXATE 2.5 MG/1
12.5 TABLET ORAL
Qty: 25 TABLET | Refills: 5 | Status: SHIPPED | OUTPATIENT
Start: 2018-06-14 | End: 2018-09-13 | Stop reason: SDUPTHER

## 2018-06-14 RX ORDER — FOLIC ACID 1 MG/1
1 TABLET ORAL DAILY
Qty: 30 TABLET | Refills: 11 | Status: SHIPPED | OUTPATIENT
Start: 2018-06-14 | End: 2019-03-06 | Stop reason: ALTCHOICE

## 2018-06-14 RX ORDER — METHYLPREDNISOLONE ACETATE 80 MG/ML
80 INJECTION, SUSPENSION INTRA-ARTICULAR; INTRALESIONAL; INTRAMUSCULAR; SOFT TISSUE
Status: DISCONTINUED | OUTPATIENT
Start: 2018-06-14 | End: 2019-01-02 | Stop reason: HOSPADM

## 2018-06-14 RX ORDER — ROSUVASTATIN CALCIUM 20 MG/1
TABLET, COATED ORAL
Qty: 90 TABLET | Refills: 0 | OUTPATIENT
Start: 2018-06-14

## 2018-06-14 RX ORDER — LISINOPRIL 5 MG/1
TABLET ORAL
Qty: 90 TABLET | Refills: 0 | OUTPATIENT
Start: 2018-06-14

## 2018-06-14 NOTE — PROGRESS NOTES
[facility] RHEUMATOLOGY           Follow-up visit    Notes dictated via Dragon to EPIC. Please forgive any unintentional errors.  Subjective:       Patient ID:   NAME: Jameel Villeda : 1945     73 y.o. male    Referring Doc: No ref. provider found  Other Physicians:    Chief Complaint:  chronic inflammatory arthritis (hands, shoulder, and knees hurt)      HPI/Interval History:   The patient is doing very well. He has found that Xeljanz is effective though not quite as effective as it had been.He has gotten clearance from his pulmonologist, Dr. Lakhani, to restart methotrexate. He is quite anxious to do so. The only complaint he has today is nocturnal leg cramps that are extremely painful and that keep him from sleeping through the night.          ROS:   GEN:    no fever, night sweats or weight loss  SKIN:   no rashes , erythema, bruising, or swelling, no Raynauds, no photosensitivity  HEENT: no changes in vision, no mouth ulcers, no sicca symptoms, no scalp tenderness, no jaw claudication.  CV:      no CP, PND, HO or orthopnea, no palpitations  PULM: no SOB, cough, hemoptysis, sputum or pleuritic pain  GI:       no abdominal pain, nausea, vomiting, constipation, diarrhea, melanotic stools, BRBPR, or hematemesis, no dysphagia, no GERD  :     no hematuria, dysuria  NEURO: no paresthesias, headaches, acute visual disturbances  MUSCULOSKELETAL:  Leg cramps at night  PSYCH:   No insomnia, no significant anxiety or depression    Medications:    Current Outpatient Prescriptions:     ambrisentan (LETAIRIS) 5 MG Tab, Take by mouth., Disp: , Rfl:     aspirin 81 MG Chew, Take by mouth., Disp: , Rfl:     BREO ELLIPTA 100-25 mcg/dose diskus inhaler, , Disp: , Rfl:     folic acid (FOLVITE) 1 MG tablet, Take 1 tablet (1 mg total) by mouth once daily., Disp: 30 tablet, Rfl: 11    furosemide (LASIX) 20 MG tablet, Take 1 Tablet (20 mg) by mouth 2 times daily, Disp: 180 tablet, Rfl: 1    gabapentin (NEURONTIN) 300 MG  capsule, Take by mouth., Disp: , Rfl:     latanoprost 0.005 % ophthalmic solution, , Disp: , Rfl:     lisinopril (PRINIVIL,ZESTRIL) 5 MG tablet, TAKE 1 TABLET BY MOUTH EVERY DAY, Disp: 90 tablet, Rfl: 0    metoprolol succinate (TOPROL-XL) 25 MG 24 hr tablet, TAKE 1/2 TABLET BY MOUTH EVERY DAY FOR 30 DAYS, Disp: 30 tablet, Rfl: 0    oxyCODONE-acetaminophen (PERCOCET)  mg per tablet, , Disp: , Rfl:     oxycodone-acetaminophen (PERCOCET) 7.5-325 mg per tablet, Take by mouth., Disp: , Rfl:     pantoprazole (PROTONIX) 40 MG tablet, Take by mouth., Disp: , Rfl:     potassium chloride (MICRO-K) 10 MEQ CpSR, TAKE 1 CAPSULE(10 MEQ) BY MOUTH EVERY DAY, Disp: 30 capsule, Rfl: 0    predniSONE (DELTASONE) 5 MG tablet, Take 1 tablet (5 mg total) by mouth 3 (three) times daily. Allow early refill once., Disp: 90 tablet, Rfl: 5    PROAIR RESPICLICK 90 mcg/actuation AePB, , Disp: , Rfl:     rosuvastatin (CRESTOR) 20 MG tablet, TAKE 1 TABLET(20 MG) BY MOUTH EVERY DAY, Disp: 90 tablet, Rfl: 0    sildenafil (REVATIO) 20 mg Tab, Take by mouth., Disp: , Rfl:     tizanidine (ZANAFLEX) 4 MG tablet, , Disp: , Rfl:     tofacitinib (XELJANZ XR) 11 mg Tb24, Take 11 mg by mouth once daily., Disp: , Rfl:     cyclobenzaprine (FLEXERIL) 10 MG tablet, Take by mouth., Disp: , Rfl:     methotrexate 2.5 MG Tab, Take 5 tablets (12.5 mg total) by mouth every 7 days., Disp: 25 tablet, Rfl: 5    quiNINE sulfate 324 mg Cap, Take 1 capsule (324 mg total) by mouth every evening., Disp: 30 capsule, Rfl: 11    Current Facility-Administered Medications:     ketorolac injection 30 mg, 30 mg, Intramuscular, 1 time in Clinic/HOD, Milan Bernabe MD    triamcinolone acetonide injection 40 mg, 40 mg, Intramuscular, Once, Milan Bernabe MD      FAMILY HISTORY: negative for Connective Tissue Disease        Review of patient's allergies indicates:   Allergen Reactions    Lorazepam     Warfarin              Objective:     Vitals:  Blood  pressure 124/66, weight 86.9 kg (191 lb 9.6 oz).    Physical Examination:   GEN: wn/wd male in no apparent distress  SKIN: no rashes, no lesions, no sclerodactyly, no Raynaud's, no periungual erythema  HEAD: no alopecia, no scalp tenderness, no temporal artery tenderness or induration.  EYES: no pallor, no icterus, PERRLA  ENT:  no thrush, no mucosal dryness or ulcerations, adequate oral hygiene & dentition.  NECK: supple x 6, no masses, no thyromegaly, no lymphadenopathy.  CV:   S1 and S2 regular, no murmurs, gallop or rubs  CHEST: Normal respiratory effort;  normal breath sounds/no adventitious sounds. No signs of consolidation.  ABD: non-tender and non-distended; soft; normal bowel sounds; no rebound/guarding or tenderness. No hepatosplenomegaly.  Musculoskeletal:  Low-grade synovitis is present in the right wrist and in the MCP joints of both hands.  EXTREM: no clubbing, cyanosis or edema. normal pulses. The Homans sign is negative bilaterally  NEURO: grossly intact; motor/sensory WNL; no tremors  PSYCH:  normal mood, affect and behavior            Labs:   Lab Results   Component Value Date    WBC 11.1 (H) 02/08/2018    HGB 12.7 (L) 02/08/2018    HCT 41.2 02/08/2018    MCV 99.3 02/08/2018     02/08/2018   CMP@  Sodium   Date Value Ref Range Status   02/08/2018 138 134 - 144 mmol/L      Potassium   Date Value Ref Range Status   02/08/2018 4.5 3.5 - 5.0 mmol/L      Chloride   Date Value Ref Range Status   02/08/2018 103 98 - 110 mmol/L      CO2   Date Value Ref Range Status   02/08/2018 23.8 22.8 - 31.6 mmol/L      Glucose   Date Value Ref Range Status   02/08/2018 98 70 - 99 mg/dL      BUN, Bld   Date Value Ref Range Status   02/08/2018 28 (H) 8 - 20 mg/dL      Creatinine   Date Value Ref Range Status   02/08/2018 1.16 0.60 - 1.40 mg/dL      Calcium   Date Value Ref Range Status   02/08/2018 10.0 7.7 - 10.4 mg/dL      Total Protein   Date Value Ref Range Status   02/08/2018 8.2 6.0 - 8.2 g/dL      Albumin    Date Value Ref Range Status   02/08/2018 4.4 3.1 - 4.7 g/dL      Total Bilirubin   Date Value Ref Range Status   02/08/2018 0.5 0.3 - 1.0 mg/dL      Alkaline Phosphatase   Date Value Ref Range Status   02/08/2018 58 40 - 104 IU/L      AST   Date Value Ref Range Status   02/08/2018 41 (H) 10 - 40 IU/L      ALT   Date Value Ref Range Status   07/28/2017 43 10 - 44 U/L Final     CRP   Date Value Ref Range Status   02/08/2018 0.15 0.00 - 1.40 mg/dL          Radiology/Diagnostic Studies:    none    Assessment/Discussion/Plan:   73 y.o. male with rheumatoid arthritis-moderate control with Xeljanz  2) Nocturnal leg cramps    PLAN:  I'm going to restart methotrexate at half the previous dose, that is, 12.5 mg weekly. I have also renewed his folic acid.  We discussed nocturnal leg cramps and I recommended quinine. He would like to try that and so I have sent a prescription into his pharmacy.  He requested an IM steroid injection and this was provided.  Routine blood testing was performed    RTC:  I will see him back in 3 months      Electronically signed by Milan Bernabe MD

## 2018-06-18 RX ORDER — LISINOPRIL 5 MG/1
5 TABLET ORAL DAILY
Qty: 30 TABLET | Refills: 1 | Status: SHIPPED | OUTPATIENT
Start: 2018-06-18 | End: 2018-08-15 | Stop reason: SDUPTHER

## 2018-06-18 RX ORDER — LISINOPRIL 5 MG/1
TABLET ORAL
Qty: 90 TABLET | Refills: 1 | OUTPATIENT
Start: 2018-06-18

## 2018-06-18 RX ORDER — ROSUVASTATIN CALCIUM 20 MG/1
TABLET, COATED ORAL
Qty: 30 TABLET | Refills: 1 | Status: SHIPPED | OUTPATIENT
Start: 2018-06-18 | End: 2018-08-15 | Stop reason: SDUPTHER

## 2018-06-18 RX ORDER — ROSUVASTATIN CALCIUM 20 MG/1
TABLET, COATED ORAL
Qty: 90 TABLET | Refills: 1 | OUTPATIENT
Start: 2018-06-18

## 2018-06-18 NOTE — TELEPHONE ENCOUNTER
----- Message from Lianna Looney sent at 6/18/2018  8:30 AM CDT -----  Type:  RX Refill Request    Who Called:  Zee Villeda - spouse  Refill or New Rx:  Refill  Name and Strength:rosuvastatin (CRESTOR) 20 MG tabletRX   lisinopril (PRINIVIL,ZESTRIL) 5 MG tablet,   How is the patient currently taking it? (ex. 1XDay):  1 x day  Is this a 30 day or 90 day RX:  90 day  Preferred Pharmacy with phone number:    [facility] [id number] [place] [address] - [address] [facility] [address]  [address]  [facility] [address]  Phone: [phone] Fax: [phone]    Local or Mail Order:  local  Ordering Provider:  Ephraim Harris  Best Call Back Number:  [phone]  Additional Information:  Patient was told by pharmacy doctor will not refill, need to schedule, offered spouse appointment today in [place], she refused states the can not come today offered next available in [place] 08/18/18 which she scheduled.     Thank you

## 2018-07-09 RX ORDER — POTASSIUM CHLORIDE 750 MG/1
TABLET, EXTENDED RELEASE ORAL
Qty: 30 TABLET | Refills: 0 | Status: SHIPPED | OUTPATIENT
Start: 2018-07-09 | End: 2018-08-16 | Stop reason: SDUPTHER

## 2018-07-16 ENCOUNTER — TELEPHONE (OUTPATIENT)
Dept: CARDIOLOGY | Facility: CLINIC | Age: 73
End: 2018-07-16

## 2018-07-16 NOTE — TELEPHONE ENCOUNTER
Please advise: pt lost 40 lbs recently, his BP this morning was 83/49 & pt wife states has been low a few times recently. Pt wife wanting to know if he should continue taking lisinopril?

## 2018-08-13 RX ORDER — METOPROLOL SUCCINATE 25 MG/1
TABLET, EXTENDED RELEASE ORAL
Qty: 30 TABLET | Refills: 0 | Status: SHIPPED | OUTPATIENT
Start: 2018-08-13 | End: 2018-09-26 | Stop reason: SDUPTHER

## 2018-08-15 ENCOUNTER — OFFICE VISIT (OUTPATIENT)
Dept: CARDIOLOGY | Facility: CLINIC | Age: 73
End: 2018-08-15
Payer: MEDICARE

## 2018-08-15 VITALS
BODY MASS INDEX: 29.86 KG/M2 | WEIGHT: 190.25 LBS | SYSTOLIC BLOOD PRESSURE: 138 MMHG | HEART RATE: 76 BPM | HEIGHT: 67 IN | DIASTOLIC BLOOD PRESSURE: 72 MMHG | OXYGEN SATURATION: 96 %

## 2018-08-15 DIAGNOSIS — I27.20 PULMONARY HTN: ICD-10-CM

## 2018-08-15 DIAGNOSIS — I10 ESSENTIAL HYPERTENSION: ICD-10-CM

## 2018-08-15 DIAGNOSIS — I25.708 CORONARY ARTERY DISEASE OF BYPASS GRAFT OF NATIVE HEART WITH STABLE ANGINA PECTORIS: Primary | ICD-10-CM

## 2018-08-15 DIAGNOSIS — E78.00 HYPERCHOLESTEROLEMIA: ICD-10-CM

## 2018-08-15 PROBLEM — I25.810 CORONARY ARTERY DISEASE INVOLVING CORONARY BYPASS GRAFT OF NATIVE HEART: Status: RESOLVED | Noted: 2017-06-14 | Resolved: 2018-08-15

## 2018-08-15 PROBLEM — Z79.02 LONG TERM (CURRENT) USE OF ANTITHROMBOTICS/ANTIPLATELETS: Status: RESOLVED | Noted: 2017-06-14 | Resolved: 2018-08-15

## 2018-08-15 PROBLEM — E66.9 OBESITY, CLASS I, BMI 30.0-34.9 (SEE ACTUAL BMI): Status: RESOLVED | Noted: 2017-06-14 | Resolved: 2018-08-15

## 2018-08-15 PROCEDURE — 99214 OFFICE O/P EST MOD 30 MIN: CPT | Mod: S$GLB,,, | Performed by: INTERNAL MEDICINE

## 2018-08-15 RX ORDER — LISINOPRIL 5 MG/1
5 TABLET ORAL DAILY
Qty: 90 TABLET | Refills: 1 | Status: SHIPPED | OUTPATIENT
Start: 2018-08-15 | End: 2019-03-06 | Stop reason: DRUGHIGH

## 2018-08-15 RX ORDER — ROSUVASTATIN CALCIUM 20 MG/1
TABLET, COATED ORAL
Qty: 90 TABLET | Refills: 1 | Status: SHIPPED | OUTPATIENT
Start: 2018-08-15 | End: 2019-02-20 | Stop reason: SDUPTHER

## 2018-08-15 NOTE — PROGRESS NOTES
Subjective:    Patient ID:  Jameel Villeda is a 73 y.o. male who presents for Follow-up (Labs pcp); Shortness of Breath; Hypertension; and Coronary Artery Disease        HPI  MISSED APPTS, LAST SEEN 6/2017, RECENT CMP ,THEN IN July [facility] ,  MORE SOB B/O METHOTREXATE FOR RA, LAST , HDL 82, NOW TAKING MEDS REGULARLY,HBA1C 6.[facility]% [facility],  SEE ROS    Past Medical History:   Diagnosis Date    Acute coronary syndrome     Cancer     skin    Coronary artery disease     Coronary artery disease involving coronary bypass graft of native heart 6/14/2017    Dyspnea     Heart murmur     Hyperlipidemia     Hypertension     Myofascial pain     PHT (pulmonary hypertension)     Polymyalgia rheumatica     Rheumatoid arthritis with rheumatoid factor of multiple sites without organ or systems involvement     Right rib fracture     Valvular regurgitation      Past Surgical History:   Procedure Laterality Date    CARDIAC CATHETERIZATION      Cardiac stents      cataracts Bilateral     CERVICAL SPINE SURGERY      CHOLECYSTECTOMY      CORONARY ANGIOPLASTY      CORONARY ARTERY BYPASS GRAFT  2001    knee replacements Bilateral     LUMBAR SPINE SURGERY       Family History   Problem Relation Age of Onset    Heart disease Mother     Heart disease Father      Social History     Socioeconomic History    Marital status:      Spouse name: None    Number of children: None    Years of education: None    Highest education level: None   Social Needs    Financial resource strain: None    Food insecurity - worry: None    Food insecurity - inability: None    Transportation needs - medical: None    Transportation needs - non-medical: None   Occupational History    None   Tobacco Use    Smoking status: Former Smoker    Smokeless tobacco: Never Used   Substance and Sexual Activity    Alcohol use: No    Drug use: No    Sexual activity: None   Other Topics Concern    None   Social History Narrative     None       Review of patient's allergies indicates:   Allergen Reactions    Lorazepam     Warfarin        Current Outpatient Medications:     ambrisentan (LETAIRIS) 5 MG Tab, Take by mouth., Disp: , Rfl:     aspirin 81 MG Chew, Take by mouth., Disp: , Rfl:     BREO ELLIPTA 100-25 mcg/dose diskus inhaler, , Disp: , Rfl:     folic acid (FOLVITE) 1 MG tablet, Take 1 tablet (1 mg total) by mouth once daily., Disp: 30 tablet, Rfl: 11    furosemide (LASIX) 20 MG tablet, Take 1 Tablet (20 mg) by mouth 2 times daily, Disp: 180 tablet, Rfl: 1    gabapentin (NEURONTIN) 300 MG capsule, Take 600 mg by mouth 3 (three) times daily. , Disp: , Rfl:     latanoprost 0.005 % ophthalmic solution, , Disp: , Rfl:     lisinopril (PRINIVIL,ZESTRIL) 5 MG tablet, Take 1 tablet (5 mg total) by mouth once daily., Disp: 90 tablet, Rfl: 1    methotrexate 2.5 MG Tab, Take 5 tablets (12.5 mg total) by mouth every 7 days., Disp: 25 tablet, Rfl: 5    metoprolol succinate (TOPROL-XL) 25 MG 24 hr tablet, TAKE 1/2 TABLET BY MOUTH EVERY DAY FOR 30 DAYS, Disp: 30 tablet, Rfl: 0    pantoprazole (PROTONIX) 40 MG tablet, Take by mouth., Disp: , Rfl:     potassium chloride (KLOR-CON) 10 MEQ TbSR, TAKE 1 TABLET(10 MEQ) BY MOUTH EVERY DAY, Disp: 30 tablet, Rfl: 0    predniSONE (DELTASONE) 5 MG tablet, Take 1 tablet (5 mg total) by mouth 3 (three) times daily. Allow early refill once., Disp: 90 tablet, Rfl: 5    PROAIR RESPICLICK 90 mcg/actuation AePB, , Disp: , Rfl:     rosuvastatin (CRESTOR) 20 MG tablet, TAKE 1 TABLET(20 MG) BY MOUTH EVERY DAY, Disp: 90 tablet, Rfl: 1    sildenafil (REVATIO) 20 mg Tab, Take by mouth., Disp: , Rfl:     tofacitinib (XELJANZ XR) 11 mg Tb24, Take by mouth once daily., Disp: , Rfl:     tofacitinib (XELJANZ XR) 11 mg Tb24, Take 11 mg by mouth once daily., Disp: , Rfl:     Current Facility-Administered Medications:     ketorolac injection 30 mg, 30 mg, Intramuscular, 1 time in Clinic/HOD, Milan Bernabe,  "MD    methylPREDNISolone acetate injection 80 mg, 80 mg, Intramuscular, 1 time in Clinic/HOD, Milan Bernabe MD    triamcinolone acetonide injection 40 mg, 40 mg, Intramuscular, Once, Milan Bernabe MD    Review of Systems   Constitution: Negative for chills, diaphoresis, fever, weakness, malaise/fatigue, night sweats and weight gain.   HENT: Negative for congestion and nosebleeds.    Eyes: Negative for blurred vision, discharge, double vision and visual disturbance.   Cardiovascular: Positive for dyspnea on exertion. Negative for chest pain, claudication, cyanosis, irregular heartbeat, leg swelling, near-syncope, orthopnea, palpitations, paroxysmal nocturnal dyspnea and syncope.   Respiratory: Positive for shortness of breath. Negative for cough, hemoptysis and wheezing (BETTER WITH INHALERS).    Endocrine: Negative for polyuria. Heat intolerance: ALL HIS LIFE.   Hematologic/Lymphatic: Negative for adenopathy and bleeding problem. Does not bruise/bleed easily.   Skin: Negative for color change, itching and nail changes.   Musculoskeletal: Positive for arthritis. Negative for back pain. Joint pain: KNEES. Muscle cramps: LEGS.   Gastrointestinal: Negative for bloating, abdominal pain, change in bowel habit, dysphagia, flatus, heartburn, hematemesis, jaundice, melena and vomiting.   Genitourinary: Negative for dysuria, flank pain, frequency, hematuria and nocturia.   Neurological: Negative for brief paralysis, dizziness, focal weakness, light-headedness, loss of balance (MILD), numbness and tremors.   Psychiatric/Behavioral: Negative for altered mental status, depression and memory loss.        Objective:      Vitals:    08/15/18 1435   BP: 138/72   Pulse: 76   SpO2: 96%   Weight: 86.3 kg (190 lb 4.1 oz)   Height: 5' 7" (1.702 m)   PainSc: 0-No pain     Body mass index is 29.8 kg/m².    Physical Exam   Constitutional: He is oriented to person, place, and time. He appears well-developed and well-nourished. He " is active.   OVER WEIGHT   HENT:   Head: Normocephalic and atraumatic.   Mouth/Throat: Oropharynx is clear and moist and mucous membranes are normal.   Eyes: Conjunctivae and EOM are normal. Pupils are equal, round, and reactive to light.   Neck: Normal range of motion. Neck supple. Normal carotid pulses, no hepatojugular reflux and no JVD present. Carotid bruit is not present. No tracheal deviation, no edema and no erythema present. No thyromegaly present.   Cardiovascular: Normal rate and regular rhythm.  No extrasystoles are present. PMI is not displaced. Exam reveals no gallop, no distant heart sounds, no friction rub and no midsystolic click.   Murmur heard.  High-pitched holosystolic murmur is present at the lower left sternal border and apex.  Pulses:       Carotid pulses are 2+ on the right side, and 2+ on the left side.       Radial pulses are 2+ on the right side, and 2+ on the left side.        Femoral pulses are 2+ on the right side, and 2+ on the left side.       Popliteal pulses are 2+ on the right side, and 2+ on the left side.        Dorsalis pedis pulses are 2+ on the right side, and 2+ on the left side.        Posterior tibial pulses are 2+ on the right side, and 2+ on the left side.   Pulmonary/Chest: Effort normal and breath sounds normal. No accessory muscle usage. No tachypnea and no bradypnea. No respiratory distress.   Abdominal: Soft. Bowel sounds are normal. He exhibits no distension and no mass. There is no hepatosplenomegaly. There is no tenderness. There is no CVA tenderness.   Musculoskeletal: Normal range of motion. He exhibits no edema or deformity.   SLOW GAIT   Lymphadenopathy:     He has no cervical adenopathy.   Neurological: He is alert and oriented to person, place, and time. He has normal strength. He displays no tremor. No cranial nerve deficit ([place]).   Skin: Skin is warm and dry. No cyanosis or erythema. No pallor.   Psychiatric: He has a normal mood and affect. His speech is  normal and behavior is normal.               ..    Chemistry        Component Value Date/Time     06/14/2018 1344    K 4.1 06/14/2018 1344     06/14/2018 1344    CO2 23.3 06/14/2018 1344    BUN 33 (H) 06/14/2018 1344    CREATININE 1.12 06/14/2018 1344     (H) 06/14/2018 1344        Component Value Date/Time    CALCIUM 9.4 06/14/2018 1344    ALKPHOS 52 06/14/2018 1344    AST 40 06/14/2018 1344    ALT 43 07/28/2017 0857    BILITOT 0.5 06/14/2018 1344    ESTGFRAFRICA >60.0 07/28/2017 0857    EGFRNONAA >60.0 07/28/2017 0857            ..  Lab Results   Component Value Date    CHOL 215 (H) 07/28/2017     Lab Results   Component Value Date    HDL 82 (H) 07/28/2017     Lab Results   Component Value Date    LDLCALC 106.0 07/28/2017     Lab Results   Component Value Date    TRIG 135 07/28/2017     Lab Results   Component Value Date    CHOLHDL 38.1 07/28/2017     ..  Lab Results   Component Value Date    WBC 10.0 06/14/2018    HGB 10.2 (L) 06/14/2018    HCT 33.3 (L) 06/14/2018    .2 (H) 06/14/2018     06/14/2018       Test(s) Reviewed  I have reviewed the following in detail:  [] Stress test   [] Angiography   [] Echocardiogram   [x] Labs   [x] Other:       Assessment:         ICD-10-CM ICD-9-CM   1. Coronary artery disease of bypass graft of native heart with stable angina pectoris I25.708 414.05     413.9   2. Essential hypertension I10 401.9   3. Pulmonary HTN I27.20 416.8   4. Hypercholesterolemia E78.00 272.0     Problem List Items Addressed This Visit        Pulmonary    Pulmonary HTN    Relevant Orders    Comprehensive metabolic panel       Cardiac/Vascular    Coronary artery disease of bypass graft of native heart with stable angina pectoris - Primary    Relevant Orders    Comprehensive metabolic panel    Lipid panel    Essential hypertension    Relevant Orders    Comprehensive metabolic panel    Hypercholesterolemia    Relevant Orders    Comprehensive metabolic panel    Lipid panel            Plan:     ALL CV CLINICALLY STABLE, CLASS 1  ANGINA, NO HF, NO TIA, NO CLINICAL ARRHYTHMIA,CONTINUE CURRENT MEDS, EDUCATION, DIET, EXERCISE, DECLINES TESTING FOR ? CAD PROGRESSION      Coronary artery disease of bypass graft of native heart with stable angina pectoris  -     Comprehensive metabolic panel; Future; Expected date: 08/15/2018  -     Lipid panel; Future; Expected date: 08/15/2018    Essential hypertension  -     Comprehensive metabolic panel; Future; Expected date: 08/15/2018    Pulmonary HTN  -     Comprehensive metabolic panel; Future; Expected date: 08/15/2018    Hypercholesterolemia  -     Comprehensive metabolic panel; Future; Expected date: 08/15/2018  -     Lipid panel; Future; Expected date: 08/15/2018    Other orders  -     lisinopril (PRINIVIL,ZESTRIL) 5 MG tablet; Take 1 tablet (5 mg total) by mouth once daily.  Dispense: 90 tablet; Refill: 1  -     rosuvastatin (CRESTOR) 20 MG tablet; TAKE 1 TABLET(20 MG) BY MOUTH EVERY DAY  Dispense: 90 tablet; Refill: 1    RTC Low level/low impact aerobic exercise 5x's/wk. Heart healthy diet and risk factor modification.    See labs and med orders.    Aerobic exercise 5x's/wk. Heart healthy diet and risk factor modification.    See labs and med orders.

## 2018-08-16 DIAGNOSIS — I27.20 PULMONARY HYPERTENSION: ICD-10-CM

## 2018-08-16 RX ORDER — POTASSIUM CHLORIDE 750 MG/1
TABLET, EXTENDED RELEASE ORAL
Qty: 30 TABLET | Refills: 5 | Status: SHIPPED | OUTPATIENT
Start: 2018-08-16 | End: 2019-02-12 | Stop reason: SDUPTHER

## 2018-08-16 NOTE — TELEPHONE ENCOUNTER
----- Message from Khushboo Tereza sent at 8/16/2018 12:08 PM CDT -----  Patients wife is requesting a medication for leg cramps to be sent into [facility].  Please call wife/Zee when called in at [phone]

## 2018-08-17 RX ORDER — FUROSEMIDE 20 MG/1
TABLET ORAL
Qty: 180 TABLET | Refills: 0 | Status: SHIPPED | OUTPATIENT
Start: 2018-08-17

## 2018-09-13 ENCOUNTER — OFFICE VISIT (OUTPATIENT)
Dept: RHEUMATOLOGY | Facility: CLINIC | Age: 73
End: 2018-09-13
Payer: MEDICARE

## 2018-09-13 VITALS
BODY MASS INDEX: 30.29 KG/M2 | WEIGHT: 193.38 LBS | DIASTOLIC BLOOD PRESSURE: 88 MMHG | SYSTOLIC BLOOD PRESSURE: 138 MMHG

## 2018-09-13 DIAGNOSIS — I27.20 PULMONARY HYPERTENSION: ICD-10-CM

## 2018-09-13 DIAGNOSIS — Z79.899 ENCOUNTER FOR LONG-TERM (CURRENT) DRUG USE: ICD-10-CM

## 2018-09-13 DIAGNOSIS — Z79.631 METHOTREXATE, LONG TERM, CURRENT USE: ICD-10-CM

## 2018-09-13 DIAGNOSIS — M19.90 CHRONIC INFLAMMATORY ARTHRITIS: Primary | ICD-10-CM

## 2018-09-13 LAB
ALBUMIN SERPL-MCNC: 4.1 G/DL (ref 3.1–4.7)
ALP SERPL-CCNC: 44 IU/L (ref 40–104)
ALT (SGPT): 34 IU/L (ref 3–33)
AST SERPL-CCNC: 39 IU/L (ref 10–40)
BASOPHILS NFR BLD: 0 K/UL (ref 0–0.2)
BASOPHILS NFR BLD: 0.1 %
BILIRUB SERPL-MCNC: 0.4 MG/DL (ref 0.3–1)
BUN SERPL-MCNC: 25 MG/DL (ref 8–20)
CALCIUM SERPL-MCNC: 9.5 MG/DL (ref 7.7–10.4)
CHLORIDE: 101 MMOL/L (ref 98–110)
CO2 SERPL-SCNC: 26 MMOL/L (ref 22.8–31.6)
CREATININE: 1.12 MG/DL (ref 0.6–1.4)
CRP SERPL-MCNC: 0.08 MG/DL (ref 0–1.4)
EOSINOPHIL NFR BLD: 0 K/UL (ref 0–0.7)
EOSINOPHIL NFR BLD: 0.1 %
ERYTHROCYTE [DISTWIDTH] IN BLOOD BY AUTOMATED COUNT: 17.8 % (ref 11.7–14.9)
GLUCOSE: 101 MG/DL (ref 70–99)
GRAN #: 7.1 K/UL (ref 1.4–6.5)
GRAN%: 80.8 %
HCT VFR BLD AUTO: 33.9 % (ref 39–55)
HGB BLD-MCNC: 10.5 G/DL (ref 14–16)
IMMATURE GRANS (ABS): 0.1 K/UL (ref 0–1)
IMMATURE GRANULOCYTES: 1.2 %
LYMPH #: 1 K/UL (ref 1.2–3.4)
LYMPH%: 11.7 %
MCH RBC QN AUTO: 30 PG (ref 25–35)
MCHC RBC AUTO-ENTMCNC: 31 G/DL (ref 31–36)
MCV RBC AUTO: 96.9 FL (ref 80–100)
MONO #: 0.5 K/UL (ref 0.1–0.6)
MONO%: 6.1 %
NUCLEATED RBCS: 0 %
PLATELET # BLD AUTO: 215 K/UL (ref 140–440)
PMV BLD AUTO: 10.4 FL (ref 8.8–12.7)
POTASSIUM SERPL-SCNC: 3.9 MMOL/L (ref 3.5–5)
PROT SERPL-MCNC: 7 G/DL (ref 6–8.2)
RBC # BLD AUTO: 3.5 M/UL (ref 4.3–5.9)
SODIUM: 138 MMOL/L (ref 134–144)
WBC # BLD AUTO: 8.8 K/UL (ref 5–10)

## 2018-09-13 PROCEDURE — 99214 OFFICE O/P EST MOD 30 MIN: CPT | Mod: ,,, | Performed by: INTERNAL MEDICINE

## 2018-09-13 RX ORDER — METHOTREXATE 2.5 MG/1
20 TABLET ORAL
Qty: 40 TABLET | Refills: 5 | Status: SHIPPED | OUTPATIENT
Start: 2018-09-13 | End: 2018-12-31

## 2018-09-13 RX ORDER — PRAMIPEXOLE DIHYDROCHLORIDE 1 MG/1
TABLET ORAL
COMMUNITY
Start: 2018-09-10

## 2018-09-13 NOTE — PROGRESS NOTES
[facility] RHEUMATOLOGY           Follow-up visit    Notes dictated via Dragon to EPIC. Please forgive any unintentional errors.  Subjective:       Patient ID:   NAME: Jameel Villeda : 1945     73 y.o. male    Referring Doc: No ref. provider found  Other Physicians:    Chief Complaint:  Chronic inflammatory arthritis (Takes Folic Acid 1mg QD, Methotrexate 2.5mg 5 tablets weekly, Prednisone 5mg TID)      HPI/Interval History:   The patient is doing well. He has been receiving Xeljanz through the . This is improved his arthritis significantly. He has also been on medication for pulmonary hypertension and has had considerable symptomatic relief. He tells me that his pulmonologist does not feel methotrexate is a problem.The patient would like to increase the dose of methotrexate again to the 20 mg.          ROS:   GEN:    no fever, night sweats or weight loss  SKIN:   no rashes , erythema, bruising, or swelling, no Raynauds, no photosensitivity  HEENT: no changes in vision, no mouth ulcers, no sicca symptoms, no scalp tenderness, no jaw claudication.  CV:      no CP, PND, HO or orthopnea, no palpitations  PULM: no SOB, cough, hemoptysis, sputum or pleuritic pain  GI:       no abdominal pain, nausea, vomiting, constipation, diarrhea, melanotic stools, BRBPR, or hematemesis, no dysphagia, no GERD  :     no hematuria, dysuria  NEURO: no paresthesias, headaches, acute visual disturbances  MUSCULOSKELETAL:  Soreness and some pain in both hands  PSYCH:   No insomnia, no significant anxiety or depression    Medications:    Current Outpatient Medications:     ambrisentan (LETAIRIS) 5 MG Tab, Take by mouth., Disp: , Rfl:     aspirin 81 MG Chew, Take by mouth., Disp: , Rfl:     BREO ELLIPTA 100-25 mcg/dose diskus inhaler, , Disp: , Rfl:     folic acid (FOLVITE) 1 MG tablet, Take 1 tablet (1 mg total) by mouth once daily., Disp: 30 tablet, Rfl: 11    furosemide (LASIX) 20 MG tablet, TAKE 1 TABLET(20 MG)  BY MOUTH TWICE DAILY, Disp: 180 tablet, Rfl: 0    gabapentin (NEURONTIN) 300 MG capsule, Take 600 mg by mouth 3 (three) times daily. , Disp: , Rfl:     latanoprost 0.005 % ophthalmic solution, , Disp: , Rfl:     lisinopril (PRINIVIL,ZESTRIL) 5 MG tablet, Take 1 tablet (5 mg total) by mouth once daily., Disp: 90 tablet, Rfl: 1    methotrexate 2.5 MG Tab, Take 8 tablets (20 mg total) by mouth every 7 days., Disp: 40 tablet, Rfl: 5    metoprolol succinate (TOPROL-XL) 25 MG 24 hr tablet, TAKE 1/2 TABLET BY MOUTH EVERY DAY FOR 30 DAYS, Disp: 30 tablet, Rfl: 0    pantoprazole (PROTONIX) 40 MG tablet, Take by mouth., Disp: , Rfl:     potassium chloride (KLOR-CON) 10 MEQ TbSR, TAKE 1 TABLET(10 MEQ) BY MOUTH EVERY DAY, Disp: 30 tablet, Rfl: 5    pramipexole (MIRAPEX) 1 MG tablet, TAKE 1 TABLET BY MOUTH DAILY EVERY NIGHT AT BEDTIME FOR LEGS CRAMPS., Disp: , Rfl:     predniSONE (DELTASONE) 5 MG tablet, Take 1 tablet (5 mg total) by mouth 3 (three) times daily. Allow early refill once., Disp: 90 tablet, Rfl: 5    PROAIR RESPICLICK 90 mcg/actuation AePB, , Disp: , Rfl:     rosuvastatin (CRESTOR) 20 MG tablet, TAKE 1 TABLET(20 MG) BY MOUTH EVERY DAY, Disp: 90 tablet, Rfl: 1    sildenafil (REVATIO) 20 mg Tab, Take by mouth., Disp: , Rfl:     tofacitinib (XELJANZ XR) 11 mg Tb24, Take 11 mg by mouth once daily., Disp: , Rfl:     Current Facility-Administered Medications:     ketorolac injection 30 mg, 30 mg, Intramuscular, 1 time in Clinic/HOD, Milan Bernabe MD    methylPREDNISolone acetate injection 80 mg, 80 mg, Intramuscular, 1 time in Clinic/HOD, Milan Bernabe MD    triamcinolone acetonide injection 40 mg, 40 mg, Intramuscular, Once, Milan Bernabe MD      FAMILY HISTORY: negative for Connective Tissue Disease        Review of patient's allergies indicates:   Allergen Reactions    Lorazepam     Warfarin              Objective:     Vitals:  Blood pressure 138/88, weight 87.7 kg (193 lb 6.4  oz).    Physical Examination:   GEN: wn/wd male in no apparent distress  SKIN: no rashes, no lesions, no sclerodactyly, no Raynaud's, no periungual erythema  HEAD: no alopecia, no scalp tenderness, no temporal artery tenderness or induration.  EYES: no pallor, no icterus, PERRLA  ENT:  no thrush, no mucosal dryness or ulcerations, adequate oral hygiene & dentition.  NECK: supple x 6, no masses, no thyromegaly, no lymphadenopathy.  CV:   S1 and S2 regular, no murmurs, gallop or rubs  CHEST: Normal respiratory effort;  normal breath sounds/no adventitious sounds. No signs of consolidation.  ABD: non-tender and non-distended; soft; normal bowel sounds; no rebound/guarding or tenderness. No hepatosplenomegaly.  Musculoskeletal:  Low-grade synovitis is present in the right wrist, in the right second and third MCP joints and in scattered PIP joints bilaterally.  EXTREM: no clubbing, cyanosis or edema. normal pulses.  NEURO: grossly intact; motor/sensory WNL; no tremors  PSYCH:  normal mood, affect and behavior            Labs:   Lab Results   Component Value Date    WBC 8.8 09/13/2018    HGB 10.5 (L) 09/13/2018    HCT 33.9 (L) 09/13/2018    MCV 96.9 09/13/2018     09/13/2018   CMP@  Sodium   Date Value Ref Range Status   09/13/2018 138 134 - 144 mmol/L      Potassium   Date Value Ref Range Status   09/13/2018 3.9 3.5 - 5.0 mmol/L      Chloride   Date Value Ref Range Status   09/13/2018 101 98 - 110 mmol/L      CO2   Date Value Ref Range Status   09/13/2018 26.0 22.8 - 31.6 mmol/L      Glucose   Date Value Ref Range Status   09/13/2018 101 (H) 70 - 99 mg/dL      BUN, Bld   Date Value Ref Range Status   09/13/2018 25 (H) 8 - 20 mg/dL      Creatinine   Date Value Ref Range Status   09/13/2018 1.12 0.60 - 1.40 mg/dL      Calcium   Date Value Ref Range Status   09/13/2018 9.5 7.7 - 10.4 mg/dL      Total Protein   Date Value Ref Range Status   09/13/2018 7.0 6.0 - 8.2 g/dL      Albumin   Date Value Ref Range Status    09/13/2018 4.1 3.1 - 4.7 g/dL      Total Bilirubin   Date Value Ref Range Status   09/13/2018 0.4 0.3 - 1.0 mg/dL      Alkaline Phosphatase   Date Value Ref Range Status   09/13/2018 44 40 - 104 IU/L      AST   Date Value Ref Range Status   09/13/2018 39 10 - 40 IU/L      ALT   Date Value Ref Range Status   07/28/2017 43 10 - 44 U/L Final     CRP   Date Value Ref Range Status   09/13/2018 0.08 0.00 - 1.40 mg/dL          Radiology/Diagnostic Studies:    none    Assessment/Discussion/Plan:   73 y.o. male with chronic inflammatory arthritis-responsive to combinations Xeljanz plus methotrexate    PLAN:  Will increase his methotrexate dose to 20 mg weekly. All other medication will remain without change. Steroids are being controlled by pulmonary.  Routine blood testing was ordered.      RTC:  I will see him back in 3-4 months.      Electronically signed by Milan Bernabe MD

## 2018-09-15 LAB — RHEUMATOID FACT SERPL-ACNC: <10 IU/ML (ref 0–13.9)

## 2018-09-24 DIAGNOSIS — M19.90 CHRONIC INFLAMMATORY ARTHRITIS: ICD-10-CM

## 2018-09-24 RX ORDER — PREDNISONE 5 MG/1
TABLET ORAL
Qty: 90 TABLET | Refills: 5 | Status: SHIPPED | OUTPATIENT
Start: 2018-09-24 | End: 2019-03-19 | Stop reason: SDUPTHER

## 2018-09-27 RX ORDER — METOPROLOL SUCCINATE 25 MG/1
TABLET, EXTENDED RELEASE ORAL
Qty: 30 TABLET | Refills: 4 | Status: ON HOLD | OUTPATIENT
Start: 2018-09-27 | End: 2018-11-27 | Stop reason: HOSPADM

## 2018-11-07 ENCOUNTER — TELEPHONE (OUTPATIENT)
Dept: RHEUMATOLOGY | Facility: CLINIC | Age: 73
End: 2018-11-07

## 2018-11-07 NOTE — TELEPHONE ENCOUNTER
Patient's breathing has gotten worse since taking the methotrexate again. Mrs. Villeda asks if he should stop it again?

## 2018-11-18 PROBLEM — I95.9 HYPOTENSION: Status: ACTIVE | Noted: 2018-11-18

## 2018-11-18 PROBLEM — E87.20 METABOLIC ACIDOSIS: Status: ACTIVE | Noted: 2018-11-18

## 2018-11-18 PROBLEM — A41.9 SEPTIC SHOCK: Status: ACTIVE | Noted: 2018-11-18

## 2018-11-18 PROBLEM — J96.01 ACUTE HYPOXEMIC RESPIRATORY FAILURE: Status: ACTIVE | Noted: 2018-11-18

## 2018-11-18 PROBLEM — R65.21 SEPTIC SHOCK: Status: ACTIVE | Noted: 2018-11-18

## 2018-11-18 PROBLEM — N17.9 ARF (ACUTE RENAL FAILURE): Status: ACTIVE | Noted: 2018-11-18

## 2018-11-18 PROBLEM — K92.2 GIB (GASTROINTESTINAL BLEEDING): Status: ACTIVE | Noted: 2018-11-18

## 2018-11-19 PROBLEM — J18.9 COMMUNITY ACQUIRED PNEUMONIA OF LEFT LUNG: Status: ACTIVE | Noted: 2018-11-19

## 2018-11-21 PROBLEM — E87.20 METABOLIC ACIDOSIS: Status: RESOLVED | Noted: 2018-11-18 | Resolved: 2018-11-21

## 2018-11-22 PROBLEM — I95.9 HYPOTENSION: Status: RESOLVED | Noted: 2018-11-18 | Resolved: 2018-11-22

## 2018-11-23 PROBLEM — R65.21 SEPTIC SHOCK: Status: RESOLVED | Noted: 2018-11-18 | Resolved: 2018-11-23

## 2018-11-23 PROBLEM — S22.000A COMPRESSION FRACTURE OF BODY OF THORACIC VERTEBRA: Status: ACTIVE | Noted: 2018-11-23

## 2018-11-23 PROBLEM — A02.0 SALMONELLA ENTERITIS: Status: ACTIVE | Noted: 2018-11-23

## 2018-11-23 PROBLEM — A41.9 SEPTIC SHOCK: Status: RESOLVED | Noted: 2018-11-18 | Resolved: 2018-11-23

## 2018-11-23 PROBLEM — N17.9 ARF (ACUTE RENAL FAILURE): Status: RESOLVED | Noted: 2018-11-18 | Resolved: 2018-11-23

## 2018-11-24 PROBLEM — R78.81 SALMONELLA BACTEREMIA: Status: ACTIVE | Noted: 2018-11-24

## 2018-11-26 DIAGNOSIS — S22.000A COMPRESSION FRACTURE OF BODY OF THORACIC VERTEBRA: Primary | ICD-10-CM

## 2018-11-27 ENCOUNTER — TELEPHONE (OUTPATIENT)
Dept: GASTROENTEROLOGY | Facility: CLINIC | Age: 73
End: 2018-11-27

## 2018-11-27 NOTE — TELEPHONE ENCOUNTER
----- Message from Randy Henao sent at 11/27/2018  4:20 PM CST -----  Type:  Sooner Apoointment Request    Caller is requesting a sooner appointment.  Caller declined first available appointment listed below.  Caller will not accept being placed on the waitlist and is requesting a message be sent to doctor.    Name of Caller:  RN at [facility]/paulo  When is the first available appointment?  01/2019  Best Call Back Number:  [phone]  Additional Information:  Patient needs to be seen by the office for a hospital visit follow up appointment. Patient has salmonella found in stool and ulcers in stomach; post egd. Please call patient to schedule.

## 2018-11-27 NOTE — TELEPHONE ENCOUNTER
Spoke to pt's wife. Instr does not need to see Dr KIM for OV, did sched repeat EGD for 01/23/19 info mailed.

## 2018-11-28 ENCOUNTER — TELEPHONE (OUTPATIENT)
Dept: CARDIOLOGY | Facility: CLINIC | Age: 73
End: 2018-11-28

## 2018-11-28 NOTE — TELEPHONE ENCOUNTER
Advised Christina to have patient increase fluids and take 1/2 BP meds, per Dr. Troncoso. Christina verbalized understanding.

## 2018-11-28 NOTE — TELEPHONE ENCOUNTER
Please advise: spoke to Christina at [facility] , stated patient BP this morning was 87/50 & stated feeling dizzy & weak. Stated has already taken BP meds this morning

## 2018-12-28 RX ORDER — TOFACITINIB 11 MG/1
TABLET, FILM COATED, EXTENDED RELEASE ORAL
Qty: 30 TABLET | Refills: 0 | Status: SHIPPED | OUTPATIENT
Start: 2018-12-28 | End: 2019-01-10 | Stop reason: SDUPTHER

## 2018-12-31 ENCOUNTER — TELEPHONE (OUTPATIENT)
Dept: NEUROSURGERY | Facility: CLINIC | Age: 73
End: 2018-12-31

## 2018-12-31 DIAGNOSIS — M81.0 OSTEOPOROSIS, UNSPECIFIED OSTEOPOROSIS TYPE, UNSPECIFIED PATHOLOGICAL FRACTURE PRESENCE: Primary | ICD-10-CM

## 2018-12-31 PROBLEM — M54.16 LUMBAR RADICULOPATHY: Status: ACTIVE | Noted: 2018-12-31

## 2018-12-31 PROBLEM — M06.9 RHEUMATOID ARTHRITIS: Status: ACTIVE | Noted: 2018-12-31

## 2018-12-31 NOTE — TELEPHONE ENCOUNTER
Referral is in system. Please schedule appt.     ----- Message from Caterina Hooks NP sent at 12/31/2018  4:09 PM CST -----  He needs referral sent to endocrinology for evaluation of osteoporosis.

## 2019-01-10 ENCOUNTER — OFFICE VISIT (OUTPATIENT)
Dept: RHEUMATOLOGY | Facility: CLINIC | Age: 74
End: 2019-01-10
Payer: MEDICARE

## 2019-01-10 VITALS
SYSTOLIC BLOOD PRESSURE: 141 MMHG | BODY MASS INDEX: 28.25 KG/M2 | WEIGHT: 180.38 LBS | DIASTOLIC BLOOD PRESSURE: 89 MMHG

## 2019-01-10 DIAGNOSIS — M81.0 OSTEOPOROSIS, UNSPECIFIED OSTEOPOROSIS TYPE, UNSPECIFIED PATHOLOGICAL FRACTURE PRESENCE: ICD-10-CM

## 2019-01-10 DIAGNOSIS — M19.90 CHRONIC INFLAMMATORY ARTHRITIS: Primary | ICD-10-CM

## 2019-01-10 DIAGNOSIS — M75.51 SUBACROMIAL BURSITIS OF RIGHT SHOULDER JOINT: ICD-10-CM

## 2019-01-10 DIAGNOSIS — M19.90 OSTEOARTHRITIS, UNSPECIFIED OSTEOARTHRITIS TYPE, UNSPECIFIED SITE: ICD-10-CM

## 2019-01-10 DIAGNOSIS — Z79.899 ENCOUNTER FOR LONG-TERM (CURRENT) DRUG USE: ICD-10-CM

## 2019-01-10 LAB
ALBUMIN SERPL-MCNC: 3.9 G/DL (ref 3.1–4.7)
ALP SERPL-CCNC: 97 IU/L (ref 40–104)
ALT (SGPT): 28 IU/L (ref 3–33)
AST SERPL-CCNC: 29 IU/L (ref 10–40)
BASOPHILS NFR BLD: 0 K/UL (ref 0–0.2)
BASOPHILS NFR BLD: 0.3 %
BILIRUB SERPL-MCNC: 0.6 MG/DL (ref 0.3–1)
BUN SERPL-MCNC: 24 MG/DL (ref 8–20)
CALCIUM SERPL-MCNC: 9.8 MG/DL (ref 7.7–10.4)
CHLORIDE: 100 MMOL/L (ref 98–110)
CO2 SERPL-SCNC: 24.7 MMOL/L (ref 22.8–31.6)
CREATININE: 1.2 MG/DL (ref 0.6–1.4)
CRP SERPL-MCNC: 0.49 MG/DL (ref 0–1.4)
EOSINOPHIL NFR BLD: 0 %
EOSINOPHIL NFR BLD: 0 K/UL (ref 0–0.7)
ERYTHROCYTE [DISTWIDTH] IN BLOOD BY AUTOMATED COUNT: 15 % (ref 11.7–14.9)
GLUCOSE: 124 MG/DL (ref 70–99)
GRAN #: 7.4 K/UL (ref 1.4–6.5)
GRAN%: 79.5 %
HCT VFR BLD AUTO: 35.5 % (ref 39–55)
HGB BLD-MCNC: 10.7 G/DL (ref 14–16)
IMMATURE GRANS (ABS): 0.2 K/UL (ref 0–1)
IMMATURE GRANULOCYTES: 1.8 %
LYMPH #: 1.2 K/UL (ref 1.2–3.4)
LYMPH%: 13 %
MCH RBC QN AUTO: 29.9 PG (ref 25–35)
MCHC RBC AUTO-ENTMCNC: 30.1 G/DL (ref 31–36)
MCV RBC AUTO: 99.2 FL (ref 80–100)
MONO #: 0.5 K/UL (ref 0.1–0.6)
MONO%: 5.4 %
NUCLEATED RBCS: 0 %
PLATELET # BLD AUTO: 272 K/UL (ref 140–440)
PMV BLD AUTO: 10.2 FL (ref 8.8–12.7)
POTASSIUM SERPL-SCNC: 4 MMOL/L (ref 3.5–5)
PROT SERPL-MCNC: 7.5 G/DL (ref 6–8.2)
RBC # BLD AUTO: 3.58 M/UL (ref 4.3–5.9)
SODIUM: 138 MMOL/L (ref 134–144)
VITAMIN D, 1,25 (OH)2: 28.6 NG/ML (ref 30–100)
WBC # BLD AUTO: 9.3 K/UL (ref 5–10)

## 2019-01-10 PROCEDURE — 20610 LARGE JOINT ASPIRATION/INJECTION: R SUBACROMIAL BURSA: ICD-10-PCS | Mod: RT,,, | Performed by: INTERNAL MEDICINE

## 2019-01-10 PROCEDURE — 99215 PR OFFICE/OUTPT VISIT, EST, LEVL V, 40-54 MIN: ICD-10-PCS | Mod: 25,,, | Performed by: INTERNAL MEDICINE

## 2019-01-10 PROCEDURE — 99215 OFFICE O/P EST HI 40 MIN: CPT | Mod: 25,,, | Performed by: INTERNAL MEDICINE

## 2019-01-10 PROCEDURE — 20610 DRAIN/INJ JOINT/BURSA W/O US: CPT | Mod: RT,,, | Performed by: INTERNAL MEDICINE

## 2019-01-10 RX ORDER — DEXAMETHASONE SODIUM PHOSPHATE 4 MG/ML
2 INJECTION, SOLUTION INTRA-ARTICULAR; INTRALESIONAL; INTRAMUSCULAR; INTRAVENOUS; SOFT TISSUE
Status: DISCONTINUED | OUTPATIENT
Start: 2019-01-10 | End: 2019-01-10 | Stop reason: HOSPADM

## 2019-01-10 RX ORDER — CALCITONIN SALMON 200 [IU]/.09ML
1 SPRAY, METERED NASAL DAILY
Qty: 1 BOTTLE | Refills: 5 | Status: SHIPPED | OUTPATIENT
Start: 2019-01-10 | End: 2019-09-25 | Stop reason: SDUPTHER

## 2019-01-10 RX ORDER — TRIAMCINOLONE ACETONIDE 40 MG/ML
40 INJECTION, SUSPENSION INTRA-ARTICULAR; INTRAMUSCULAR
Status: DISCONTINUED | OUTPATIENT
Start: 2019-01-10 | End: 2019-01-10 | Stop reason: HOSPADM

## 2019-01-10 RX ADMIN — TRIAMCINOLONE ACETONIDE 40 MG: 40 INJECTION, SUSPENSION INTRA-ARTICULAR; INTRAMUSCULAR at 06:01

## 2019-01-10 RX ADMIN — DEXAMETHASONE SODIUM PHOSPHATE 2 MG: 4 INJECTION, SOLUTION INTRA-ARTICULAR; INTRALESIONAL; INTRAMUSCULAR; INTRAVENOUS; SOFT TISSUE at 06:01

## 2019-01-10 NOTE — PATIENT INSTRUCTIONS
Back Fracture (Compression Fracture)  Your spine stretches from the base of your skull to your tailbone. It's composed of 33 bones (vertebrae) stacked on top of one another. These bones are strong enough to support the weight of your upper body. Certain injuries, however, can damage one or more of the vertebrae and cause them to collapse. A collapsed bone in your spine is known as a compression fracture.    Causes of compression fracture  Many compression fractures result from osteoporosis. This disease thins your bones so they can't withstand normal pressure. Trauma from a car accident or hard fall can fracture even healthy vertebrae. In rare cases, vertebrae may fracture for unknown reasons.  When to go to the Emergency Room (ER)  Call 911 if you've been in an accident or had a fall and have neck or back pain, especially when pain occurs with any of these symptoms:  · Loss of control over your bowels or bladder  · Numbness or weakness  · High fever  · Unexplained back pain in a person with cancer  What to expect in the ER  A healthcare provider will ask about your medical history and examine you. In some cases, you may have X-rays. You also may have other tests, such as computed tomography (CT) or magnetic resonance imaging (MRI). These tests can provide detailed images of your bones and spinal cord.  Treatment  Treatment will depend on the type and cause of the fracture. You will be given medicine for pain. Severe fractures or those that cause nerve problems may need surgery. Many compression fractures mend on their own.  Follow-up  As you improve, you may be given exercises to strengthen your bones. If you have osteoporosis, your healthcare provider may prescribe a medicine to treat it. Sometimes you may have pain even after the bone has healed. In that case, your healthcare provider will discuss your choices with you.  Date Last Reviewed: 9/30/2015  © 8086-3148 [email or web address]. [address] [place] [address]  Road, [place] [address]. All rights reserved. This information is not intended as a substitute for professional medical care. Always follow your healthcare professional's instructions.

## 2019-01-11 NOTE — PROCEDURES
Large Joint Aspiration/Injection: R subacromial bursa  Date/Time: 1/10/2019 6:20 PM  Performed by: Milan Bernabe MD  Authorized by: Milan Bernabe MD     Consent Done?:  Yes (Verbal)  Indications:  Pain  Procedure site marked: Yes    Timeout: Prior to procedure the correct patient, procedure, and site was verified      Location:  Shoulder  Site:  R subacromial bursa  Prep: Patient was prepped and draped in usual sterile fashion    Ultrasonic Guidance for needle placement: No  Needle size:  22 G  Approach:  Anterolateral  Medications:  40 mg triamcinolone acetonide 40 mg/mL; 2 mg dexamethasone 4 mg/mL  Aspirate amount (ml):  0  Patient tolerance:  Patient tolerated the procedure well with no immediate complications

## 2019-01-11 NOTE — PROGRESS NOTES
[facility] RHEUMATOLOGY           Follow-up visit    Notes dictated via Dragon to EPIC. Please forgive any unintended errors.  Subjective:       Patient ID:   NAME: Jameel Villeda : 1945     73 y.o. male    Referring Doc: No ref. provider found  Other Physicians:    Chief Complaint:  Chronic inflammatory arthritis (Patient states he has not been doing to well since his last visit)      HPI/Interval History:   The patient has had multiple compression fractures since the last visit. He underwent a successful kyphoplasty in 2018 but has subsequently ractured 3 additional vertebrae.  Apparently, kyphoplasty is not now contemplated. He is in very significant pain and his ability to ambulate conrad been limited. Is having very significant pain in the right shoulder which is the side he carries his cane.        ROS:   GEN:    no fever, night sweats or weight loss  SKIN:   no rashes , erythema, bruising, or swelling, no Raynauds, no photosensitivity  HEENT: no changes in vision, no mouth ulcers, no sicca symptoms, no scalp tenderness, no jaw claudication.  CV:      no CP, PND, HO or orthopnea, no palpitations  PULM: no SOB, cough, hemoptysis, sputum or pleuritic pain  GI:       no GERD, no dysphagia, no abdominal pain, nausea, vomiting, constipation, diarrhea, melanotic stools, BRBPR, or hematemesis  :      no hematuria, dysuria  NEURO: no paresthesias, headaches, acute visual disturbances  MUSCULOSKELETAL:  Pain throughout the lumbar spine and in the right shoulder as above. No red, hot, and/or swollen joints  PSYCH:   No insomnia, no significant anxiety or depression    Medications:    Current Outpatient Medications:     ambrisentan (LETAIRIS) 5 MG Tab, Take by mouth., Disp: , Rfl:     aspirin 81 MG Chew, Take by mouth., Disp: , Rfl:     BREO ELLIPTA 100-25 mcg/dose diskus inhaler, , Disp: , Rfl:     folic acid (FOLVITE) 1 MG tablet, Take 1 tablet (1 mg total) by mouth once daily., Disp: 30 tablet,  Rfl: 11    furosemide (LASIX) 20 MG tablet, TAKE 1 TABLET(20 MG) BY MOUTH TWICE DAILY, Disp: 180 tablet, Rfl: 0    gabapentin (NEURONTIN) 300 MG capsule, Take 600 mg by mouth 3 (three) times daily. , Disp: , Rfl:     HYDROcodone-acetaminophen (NORCO)  mg per tablet, Take 1 tablet by mouth every 8 (eight) hours as needed for Pain., Disp: 30 tablet, Rfl: 0    HYDROcodone-acetaminophen (NORCO) 5-325 mg per tablet, Take 2 tablets by mouth every 6 (six) hours as needed for Pain., Disp: 30 tablet, Rfl: 0    L.acidophil,parac-S.therm-Bif. (RISAQUAD) Cap capsule, Take 1 capsule by mouth once daily., Disp: , Rfl:     latanoprost 0.005 % ophthalmic solution, , Disp: , Rfl:     lisinopril (PRINIVIL,ZESTRIL) 5 MG tablet, Take 1 tablet (5 mg total) by mouth once daily., Disp: 90 tablet, Rfl: 1    metoprolol tartrate (LOPRESSOR) 50 MG tablet, Take 1 tablet (50 mg total) by mouth 3 (three) times daily., Disp: 90 tablet, Rfl: 11    pantoprazole (PROTONIX) 40 MG tablet, Take 1 tablet (40 mg total) by mouth 2 (two) times daily., Disp: 60 tablet, Rfl: 11    potassium chloride (KLOR-CON) 10 MEQ TbSR, TAKE 1 TABLET(10 MEQ) BY MOUTH EVERY DAY, Disp: 30 tablet, Rfl: 5    pramipexole (MIRAPEX) 1 MG tablet, TAKE 1 TABLET BY MOUTH DAILY EVERY NIGHT AT BEDTIME FOR LEGS CRAMPS., Disp: , Rfl:     predniSONE (DELTASONE) 5 MG tablet, TAKE 1 TABLET BY MOUTH THREE TIMES DAILY, Disp: 90 tablet, Rfl: 5    PROAIR RESPICLICK 90 mcg/actuation AePB, , Disp: , Rfl:     rosuvastatin (CRESTOR) 20 MG tablet, TAKE 1 TABLET(20 MG) BY MOUTH EVERY DAY, Disp: 90 tablet, Rfl: 1    sildenafil (REVATIO) 20 mg Tab, Take by mouth., Disp: , Rfl:     tofacitinib (XELJANZ XR) 11 mg Tb24, Take 1 tablet by mouth every day as directed by physician., Disp: 30 tablet, Rfl: 11    calcitonin, salmon, (FORTICAL) 200 unit/actuation nasal spray, 1 spray by Nasal route once daily., Disp: 1 Bottle, Rfl: 5    Current Facility-Administered Medications:      triamcinolone acetonide injection 40 mg, 40 mg, Intramuscular, Once, Milan Bernabe MD      FAMILY HISTORY: negative for Connective Tissue Disease        Review of patient's allergies indicates:   Allergen Reactions    Lorazepam     Warfarin     Ciprofloxacin hcl Rash             Objective:     Vitals:  Blood pressure (!) 141/89, weight 81.8 kg (180 lb 6.4 oz).    Physical Examination:   GEN: wn/wd male in no apparent distress  SKIN: no rashes, no sclerodactyly, no Raynaud's, no periungual erythema, no digital tip ulcerations, no nailbed pitting  HEAD: no alopecia, no scalp tenderness, no temporal artery tenderness or induration.  EYES: no pallor, no icterus, PERRLA  ENT:  no thrush, no mucosal dryness or ulcerations, adequate oral hygiene & dentition.  NECK: supple x 6, no masses, no thyromegaly, no lymphadenopathy.  CV:   S1 and S2 regular, no murmurs, gallop or rubs  CHEST: Normal respiratory effort;  normal breath sounds/no adventitious sounds. No signs of consolidation.  ABD: non-tender and non-distended; soft; normal bowel sounds; no rebound/guarding or tenderness. No hepatosplenomegaly.  Musculoskeletal:  Tenderness overlying the right subacromial bursa without any redness, warmth, or swelling. No other evidence of active inflammatory disease. Marked bilateral lumbar paraspinal spasm noted.   EXTREM: no clubbing, cyanosis or edema. normal pulses.  NEURO:  grossly intact; motor/sensory WNL; no tremors  PSYCH:  normal mood, affect and behavior            Labs:   Lab Results   Component Value Date    WBC 9.3 01/10/2019    HGB 10.7 (L) 01/10/2019    HCT 35.5 (L) 01/10/2019    MCV 99.2 01/10/2019     01/10/2019   CMP@  Sodium   Date Value Ref Range Status   01/10/2019 138 134 - 144 mmol/L      Potassium   Date Value Ref Range Status   01/10/2019 4.0 3.5 - 5.0 mmol/L      Chloride   Date Value Ref Range Status   01/10/2019 100 98 - 110 mmol/L      CO2   Date Value Ref Range Status   01/10/2019 24.7  22.8 - 31.6 mmol/L      Glucose   Date Value Ref Range Status   01/10/2019 124 (H) 70 - 99 mg/dL      BUN, Bld   Date Value Ref Range Status   01/10/2019 24 (H) 8 - 20 mg/dL      Creatinine   Date Value Ref Range Status   01/10/2019 1.20 0.60 - 1.40 mg/dL      Calcium   Date Value Ref Range Status   01/10/2019 9.8 7.7 - 10.4 mg/dL      Total Protein   Date Value Ref Range Status   01/10/2019 7.5 6.0 - 8.2 g/dL      Albumin   Date Value Ref Range Status   01/10/2019 3.9 3.1 - 4.7 g/dL      Total Bilirubin   Date Value Ref Range Status   01/10/2019 0.6 0.3 - 1.0 mg/dL      Alkaline Phosphatase   Date Value Ref Range Status   01/10/2019 97 40 - 104 IU/L      AST   Date Value Ref Range Status   01/10/2019 29 10 - 40 IU/L      ALT   Date Value Ref Range Status   01/01/2019 37 10 - 44 U/L Final     CRP   Date Value Ref Range Status   01/10/2019 0.49 0.00 - 1.40 mg/dL      Rheumatoid Factor   Date Value Ref Range Status   09/13/2018 <10.0 0.0 - 13.9 IU/mL      Comment:     Performed at: [facility] [address], [id number] Ragland, [place], Phone:  [phone]     Uric Acid   Date Value Ref Range Status   11/19/2018 3.7 3.4 - 7.0 mg/dL Final         Radiology/Diagnostic Studies:    none    Assessment/Discussion/Plan:   73 y.o. male with chronic inflammatory arthritis-excellent control with Xeljanz 11 mg daily  2) sequential compression fractures of the lumbar spine-osteoporotic evaluation not performed  3) RIGHT SUBACROMIAL BURSITIS-SECONDARY TO USE OF ASSISTIVE DEVICE    PLAN: I will continue his rheumatoid medication without change.  I have ordered a DEXA scan to assess osteoporosis. I have begun him on Neocate calcium spray for its usual benefit of diminishing post compression fracture pain. Whether it has a long-term use for osteoporosis will depend on the results of the scan. Because of his chronic upper GI issues, I am not going to place him on any oral bisphosphonate. I have  discussed intravenous medication for osteoporosis and I will make the decision after I have reviewed the scan.  The right shoulder was injected at his request. He tolerated that well and there were no complications.  Routine blood testing was obtained.    RTC:  I will see him back in 3-4 months or sooner if needed      Electronically signed by Milan Bernabe MD

## 2019-01-14 DIAGNOSIS — E55.9 VITAMIN D DEFICIENCY: Primary | ICD-10-CM

## 2019-01-14 RX ORDER — ERGOCALCIFEROL 1.25 MG/1
50000 CAPSULE ORAL
COMMUNITY
End: 2019-01-14 | Stop reason: SDUPTHER

## 2019-01-14 RX ORDER — ERGOCALCIFEROL 1.25 MG/1
50000 CAPSULE ORAL
Qty: 4 CAPSULE | Refills: 5 | Status: SHIPPED | OUTPATIENT
Start: 2019-01-14

## 2019-01-16 ENCOUNTER — TELEPHONE (OUTPATIENT)
Dept: CARDIOLOGY | Facility: CLINIC | Age: 74
End: 2019-01-16

## 2019-01-16 NOTE — TELEPHONE ENCOUNTER
----- Message from Gregoria Mccray sent at 1/16/2019 11:33 AM CST -----  Contact: Briana Mera  (Nurse w/ [facility])  Type: Needs Medical Advice    Who Called:  Briana Mera  (Nurse w/ [facility])  Symptoms (please be specific):  Patient's bp is 158/101, he didn't take his Lisinopril. He just took metoprolol tartrate (LOPRESSOR) 50 MG tablet  How long has patient had these symptoms:  today  Pharmacy name and phone #:  na  Best Call Back Number: Briana Mera at   Additional Information: Calling to speak with the Nurse about the symptoms. Please advise. Call to pod. No answer.

## 2019-01-16 NOTE — TELEPHONE ENCOUNTER
Advised patients wife to have patient take lisinopril if BP is still high. Patient wife verbalized understanding.

## 2019-01-23 ENCOUNTER — HOSPITAL ENCOUNTER (OUTPATIENT)
Facility: HOSPITAL | Age: 74
Discharge: HOME | End: 2019-01-23
Attending: INTERNAL MEDICINE | Admitting: INTERNAL MEDICINE
Payer: MEDICARE

## 2019-01-23 ENCOUNTER — ANESTHESIA (OUTPATIENT)
Dept: ENDOSCOPY | Facility: HOSPITAL | Age: 74
End: 2019-01-23
Payer: MEDICARE

## 2019-01-23 ENCOUNTER — ANESTHESIA EVENT (OUTPATIENT)
Dept: ENDOSCOPY | Facility: HOSPITAL | Age: 74
End: 2019-01-23
Payer: MEDICARE

## 2019-01-23 VITALS
HEIGHT: 67 IN | BODY MASS INDEX: 27.78 KG/M2 | SYSTOLIC BLOOD PRESSURE: 133 MMHG | HEART RATE: 74 BPM | OXYGEN SATURATION: 99 % | DIASTOLIC BLOOD PRESSURE: 78 MMHG | RESPIRATION RATE: 18 BRPM | WEIGHT: 177 LBS | TEMPERATURE: 98 F

## 2019-01-23 DIAGNOSIS — K21.9 GERD (GASTROESOPHAGEAL REFLUX DISEASE): ICD-10-CM

## 2019-01-23 PROCEDURE — 88341 PR IHC OR ICC EACH ADD'L SINGLE ANTIBODY  STAINPR: ICD-10-PCS | Mod: 26,,, | Performed by: PATHOLOGY

## 2019-01-23 PROCEDURE — D9220A PRA ANESTHESIA: ICD-10-PCS | Mod: ANES,,, | Performed by: ANESTHESIOLOGY

## 2019-01-23 PROCEDURE — 25000003 PHARM REV CODE 250: Mod: PO | Performed by: INTERNAL MEDICINE

## 2019-01-23 PROCEDURE — 37000009 HC ANESTHESIA EA ADD 15 MINS: Mod: PO | Performed by: INTERNAL MEDICINE

## 2019-01-23 PROCEDURE — D9220A PRA ANESTHESIA: ICD-10-PCS | Mod: CRNA,,, | Performed by: NURSE ANESTHETIST, CERTIFIED REGISTERED

## 2019-01-23 PROCEDURE — 27201012 HC FORCEPS, HOT/COLD, DISP: Mod: PO | Performed by: INTERNAL MEDICINE

## 2019-01-23 PROCEDURE — 37000008 HC ANESTHESIA 1ST 15 MINUTES: Mod: PO | Performed by: INTERNAL MEDICINE

## 2019-01-23 PROCEDURE — D9220A PRA ANESTHESIA: Mod: ANES,,, | Performed by: ANESTHESIOLOGY

## 2019-01-23 PROCEDURE — 88305 TISSUE EXAM BY PATHOLOGIST: CPT | Mod: 59 | Performed by: PATHOLOGY

## 2019-01-23 PROCEDURE — 88312 TISSUE SPECIMEN TO PATHOLOGY - SURGERY: ICD-10-PCS | Mod: 26,,, | Performed by: PATHOLOGY

## 2019-01-23 PROCEDURE — 88305 TISSUE SPECIMEN TO PATHOLOGY - SURGERY: ICD-10-PCS | Mod: 26,,, | Performed by: PATHOLOGY

## 2019-01-23 PROCEDURE — 88312 SPECIAL STAINS GROUP 1: CPT | Mod: 26,,, | Performed by: PATHOLOGY

## 2019-01-23 PROCEDURE — 88342 TISSUE SPECIMEN TO PATHOLOGY - SURGERY: ICD-10-PCS | Mod: 26,,, | Performed by: PATHOLOGY

## 2019-01-23 PROCEDURE — D9220A PRA ANESTHESIA: Mod: CRNA,,, | Performed by: NURSE ANESTHETIST, CERTIFIED REGISTERED

## 2019-01-23 PROCEDURE — 88305 TISSUE EXAM BY PATHOLOGIST: CPT | Mod: 26,,, | Performed by: PATHOLOGY

## 2019-01-23 PROCEDURE — 43239 PR EGD, FLEX, W/BIOPSY, SGL/MULTI: ICD-10-PCS | Mod: ,,, | Performed by: INTERNAL MEDICINE

## 2019-01-23 PROCEDURE — 63600175 PHARM REV CODE 636 W HCPCS: Mod: PO | Performed by: NURSE ANESTHETIST, CERTIFIED REGISTERED

## 2019-01-23 PROCEDURE — 43239 EGD BIOPSY SINGLE/MULTIPLE: CPT | Mod: PO | Performed by: INTERNAL MEDICINE

## 2019-01-23 PROCEDURE — 88342 IMHCHEM/IMCYTCHM 1ST ANTB: CPT | Mod: 26,,, | Performed by: PATHOLOGY

## 2019-01-23 PROCEDURE — 43239 EGD BIOPSY SINGLE/MULTIPLE: CPT | Mod: ,,, | Performed by: INTERNAL MEDICINE

## 2019-01-23 PROCEDURE — 88341 IMHCHEM/IMCYTCHM EA ADD ANTB: CPT | Mod: 26,,, | Performed by: PATHOLOGY

## 2019-01-23 RX ORDER — SODIUM CHLORIDE, SODIUM LACTATE, POTASSIUM CHLORIDE, CALCIUM CHLORIDE 600; 310; 30; 20 MG/100ML; MG/100ML; MG/100ML; MG/100ML
INJECTION, SOLUTION INTRAVENOUS CONTINUOUS
Status: DISCONTINUED | OUTPATIENT
Start: 2019-01-23 | End: 2019-01-23 | Stop reason: HOSPADM

## 2019-01-23 RX ORDER — PROPOFOL 10 MG/ML
VIAL (ML) INTRAVENOUS
Status: DISCONTINUED | OUTPATIENT
Start: 2019-01-23 | End: 2019-01-23

## 2019-01-23 RX ORDER — SODIUM CHLORIDE 0.9 % (FLUSH) 0.9 %
3 SYRINGE (ML) INJECTION
Status: DISCONTINUED | OUTPATIENT
Start: 2019-01-23 | End: 2019-01-23 | Stop reason: HOSPADM

## 2019-01-23 RX ORDER — LIDOCAINE HCL/PF 100 MG/5ML
SYRINGE (ML) INTRAVENOUS
Status: DISCONTINUED | OUTPATIENT
Start: 2019-01-23 | End: 2019-01-23

## 2019-01-23 RX ADMIN — PROPOFOL 40 MG: 10 INJECTION, EMULSION INTRAVENOUS at 12:01

## 2019-01-23 RX ADMIN — LIDOCAINE HYDROCHLORIDE 75 MG: 20 INJECTION, SOLUTION INTRAVENOUS at 12:01

## 2019-01-23 RX ADMIN — PROPOFOL 30 MG: 10 INJECTION, EMULSION INTRAVENOUS at 12:01

## 2019-01-23 RX ADMIN — SODIUM CHLORIDE, SODIUM LACTATE, POTASSIUM CHLORIDE, AND CALCIUM CHLORIDE: .6; .31; .03; .02 INJECTION, SOLUTION INTRAVENOUS at 11:01

## 2019-01-23 RX ADMIN — PROPOFOL 120 MG: 10 INJECTION, EMULSION INTRAVENOUS at 12:01

## 2019-01-23 NOTE — ANESTHESIA POSTPROCEDURE EVALUATION
"Anesthesia Post Evaluation    Patient: Jameel Villeda    Procedure(s) Performed: Procedure(s) (LRB):  EGD (ESOPHAGOGASTRODUODENOSCOPY) (N/A)    Final Anesthesia Type: general  Patient location during evaluation: PACU  Patient participation: Yes- Able to Participate  Level of consciousness: awake and alert, oriented and awake  Post-procedure vital signs: reviewed and stable  Pain management: adequate  Airway patency: patent  PONV status at discharge: No PONV  Anesthetic complications: no      Cardiovascular status: blood pressure returned to baseline and hemodynamically stable  Respiratory status: unassisted, spontaneous ventilation and room air  Hydration status: euvolemic  Follow-up not needed.        Visit Vitals  /78 (BP Location: Left arm, Patient Position: Lying)   Pulse 74   Temp 36.6 °C (97.8 °F) (Skin)   Resp 18   Ht 5' 7" (1.702 m)   Wt 80.3 kg (177 lb)   SpO2 99%   BMI 27.72 kg/m²       Pain/Michelle Score: Michelle Score: 10 (1/23/2019  1:02 PM)        "

## 2019-01-23 NOTE — DISCHARGE INSTRUCTIONS
Recovery After Procedural Sedation (Adult)  You have been given medicine by vein to make you sleep during your surgery. This may have included both a pain medicine and sleeping medicine. Most of the effects have worn off. But you may still have some drowsiness for the next 6 to 8 hours.  Home care  Follow these guidelines when you get home:  · For the next 8 hours, you should be watched by a responsible adult. This person should make sure your condition is not getting worse.  · Don't drink any alcohol for the next 24 hours.  · Don't drive, operate dangerous machinery, or make important business or personal decisions during the next 24 hours.  Note: Your healthcare provider may tell you not to take any medicine by mouth for pain or sleep in the next 4 hours. These medicines may react with the medicines you were given in the hospital. This could cause a much stronger response than usual.  Follow-up care  Follow up with your healthcare provider if you are not alert and back to your usual level of activity within 12 hours.  When to seek medical advice  Call your healthcare provider right away if any of these occur:  · Drowsiness gets worse  · Weakness or dizziness gets worse  · Repeated vomiting  · You can't be awakened   Date Last Reviewed: 10/18/2016  © 1262-1698 [employer]. [address] [place] [address], [place] [address]. All rights reserved. This information is not intended as a substitute for professional medical care. Always follow your healthcare professional's instructions.      What Is Cabrera Esophagus?          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 This means that there have been changes to the lining of the esophagus near the stomach. The changes may have been caused by the acid reflux that happens with GERD (gastroesophageal reflux disease). The changed lining is not cancerous, but may increase your chances of developing cancer later on.      When you have GERD  The esophagus is the tube that carries food  and liquid from the mouth to the stomach. Your lower esophageal sphincter (LES) is a one-way valve at the top of the stomach. It keeps food and stomach acid from flowing backward. If the LES is weakened, food and stomach acid flow back (reflux) into your esophagus. If this happens often, the condition is called GERD.  Changes in the lining  The stomach is kept safe from its own acid by a special lining. The esophagus isnt meant to contact stomach acid. With GERD, acid flows back into the esophagus often. This damages the esophagus. In response to the damage, new tissue forms that is not normal. This is Cabrera esophagus. The new tissue may keep changing. This is why it is more likely to become cancer in the future.  Preventing further damage  Your healthcare provider may suggest regular tests to keep track of changes in the esophagus. This usually includes an endoscopy, when a flexible lighted scope is placed through the mouth into the esophagus. Biopsies (tissue samples) can be taken of the abnormal areas. You are usually sedated with an IV medicine for comfort. He or she may also suggest ways for you to control GERD. This includes lifestyle changes, medicine, or even surgery. This should help keep your Cabrera esophagus from getting worse.  Symptoms of GERD  Symptoms include the following:  · Heartburn  · Sour-tasting fluid backing up into your mouth  · Frequent burping or belching  · Symptoms that get worse after you eat, bend over, or lie down  · Coughing repeatedly to clear your throat  · Hoarseness   Date Last Reviewed: 6/1/2016  © 9106-9809 [employer]. [address] [place] [address], [place] [address]. All rights reserved. This information is not intended as a substitute for professional medical care. Always follow your healthcare professional's instructions.

## 2019-01-23 NOTE — [employer] PATIENT INSTRUCTIONS
Discharge Summary/Instructions after an Endoscopic Procedure  Patient Name: Jameel Villeda  Patient MRN: [id number]  Patient YOB: 1945  Wednesday, January 23, 2019  Lenin Morel MD  RESTRICTIONS:  During your procedure today, you received medications for sedation.  These   medications may affect your judgment, balance and coordination.  Therefore,   for 24 hours, you have the following restrictions:   - DO NOT drive a car, operate machinery, make legal/financial decisions,   sign important papers or drink alcohol.    ACTIVITY:  Today: no heavy lifting, straining or running due to procedural   sedation/anesthesia.  The following day: return to full activity including work.  DIET:  Eat and drink normally unless instructed otherwise.     TREATMENT FOR COMMON SIDE EFFECTS:  - Mild abdominal pain, nausea, belching, bloating or excessive gas:  rest,   eat lightly and use a heating pad.  - Sore Throat: treat with throat lozenges and/or gargle with warm salt   water.  - Because air was used during the procedure, expelling large amounts of air   from your rectum or belching is normal.  - If a bowel prep was taken, you may not have a bowel movement for 1-3 days.    This is normal.  SYMPTOMS TO WATCH FOR AND REPORT TO YOUR PHYSICIAN:  1. Abdominal pain or bloating, other than gas cramps.  2. Chest pain.  3. Back pain.  4. Signs of infection such as: chills or fever occurring within 24 hours   after the procedure.  5. Rectal bleeding, which would show as bright red, maroon, or black stools.   (A tablespoon of blood from the rectum is not serious, especially if   hemorrhoids are present.)  6. Vomiting.  7. Weakness or dizziness.  GO DIRECTLY TO THE NEAREST EMERGENCY ROOM IF YOU HAVE ANY OF THE FOLLOWING:      Difficulty breathing              Chills and/or fever over 101 F   Persistent vomiting and/or vomiting blood   Severe abdominal pain   Severe chest pain   Black, tarry stools   Bleeding- more than one  tablespoon   Any other symptom or condition that you feel may need urgent attention  Your doctor recommends these additional instructions:  If any biopsies were taken, your doctors clinic will contact you in 1 to 2   weeks with any results.  We are waiting for your pathology results.   Continue your present medications.   Your physician has recommended a repeat upper endoscopy in three years for   surveillance based on pathology results.   You are being discharged to home.  For questions, problems or results please call your physician - Lenin Morel MD at Work: [phone].  EMERGENCY PHONE NUMBER: [phone], LAB RESULTS: [phone]  IF A COMPLICATION OR EMERGENCY SITUATION ARISES AND YOU ARE UNABLE TO REACH   YOUR PHYSICIAN - GO DIRECTLY TO THE EMERGENCY ROOM.  ___________________________________________  Nurse Signature  ___________________________________________  Patient/Designated Responsible Party Signature  Lenin Morel MD  1/23/2019 12:30:44 PM  This report has been verified and signed electronically.  [employer]

## 2019-01-23 NOTE — H&P
History & Physical - Short Stay  Gastroenterology      SUBJECTIVE:     Procedure: EGD    Chief Complaint/Indication for Procedure: Reflux    Facility-Administered Medications Prior to Admission   Medication    triamcinolone acetonide injection 40 mg     PTA Medications   Medication Sig    ambrisentan (LETAIRIS) 5 MG Tab Take by mouth.    aspirin 81 MG Chew Take by mouth.    BREO ELLIPTA 100-25 mcg/dose diskus inhaler     calcitonin, salmon, (FORTICAL) 200 unit/actuation nasal spray 1 spray by Nasal route once daily.    ergocalciferol (ERGOCALCIFEROL) 50,000 unit Cap Take 1 capsule (50,000 Units total) by mouth every 7 days.    furosemide (LASIX) 20 MG tablet TAKE 1 TABLET(20 MG) BY MOUTH TWICE DAILY    gabapentin (NEURONTIN) 300 MG capsule Take 600 mg by mouth 3 (three) times daily.     HYDROcodone-acetaminophen (NORCO)  mg per tablet Take 1 tablet by mouth every 8 (eight) hours as needed for Pain.    HYDROcodone-acetaminophen (NORCO) 5-325 mg per tablet Take 2 tablets by mouth every 6 (six) hours as needed for Pain.    L.acidophil,parac-S.therm-Bif. (RISAQUAD) Cap capsule Take 1 capsule by mouth once daily.    latanoprost 0.005 % ophthalmic solution     lisinopril (PRINIVIL,ZESTRIL) 5 MG tablet Take 1 tablet (5 mg total) by mouth once daily.    metoprolol tartrate (LOPRESSOR) 50 MG tablet Take 1 tablet (50 mg total) by mouth 3 (three) times daily.    pantoprazole (PROTONIX) 40 MG tablet Take 1 tablet (40 mg total) by mouth 2 (two) times daily.    potassium chloride (KLOR-CON) 10 MEQ TbSR TAKE 1 TABLET(10 MEQ) BY MOUTH EVERY DAY    pramipexole (MIRAPEX) 1 MG tablet TAKE 1 TABLET BY MOUTH DAILY EVERY NIGHT AT BEDTIME FOR LEGS CRAMPS.    predniSONE (DELTASONE) 5 MG tablet TAKE 1 TABLET BY MOUTH THREE TIMES DAILY    rosuvastatin (CRESTOR) 20 MG tablet TAKE 1 TABLET(20 MG) BY MOUTH EVERY DAY    sildenafil (REVATIO) 20 mg Tab Take by mouth.    tofacitinib (XELJANZ XR) 11 mg Tb24 Take 1 tablet  by mouth every day as directed by physician.    folic acid (FOLVITE) 1 MG tablet Take 1 tablet (1 mg total) by mouth once daily.    PROAIR RESPICLICK 90 mcg/actuation AePB        Review of patient's allergies indicates:   Allergen Reactions    Lorazepam     Warfarin     Ciprofloxacin hcl Rash        Past Medical History:   Diagnosis Date    Acute coronary syndrome     Cancer     skin    CHF (congestive heart failure)     Coronary artery disease     Coronary artery disease involving coronary bypass graft of native heart 6/14/2017    Dyspnea     Heart murmur     Hyperlipidemia     Hypertension     Myofascial pain     PAF (paroxysmal atrial fibrillation)     PHT (pulmonary hypertension)     Polymyalgia rheumatica     Rheumatoid arthritis with rheumatoid factor of multiple sites without organ or systems involvement     Right rib fracture     Valvular regurgitation      Past Surgical History:   Procedure Laterality Date    CARDIAC CATHETERIZATION      Cardiac stents      cataracts Bilateral     CERVICAL SPINE SURGERY      CHOLECYSTECTOMY      CORONARY ANGIOPLASTY      CORONARY ARTERY BYPASS GRAFT  2001    EGD (ESOPHAGOGASTRODUODENOSCOPY)-in ICU- on Drip N/A 11/20/2018    [facility] at [facility] ENDO    EYE SURGERY      JOINT REPLACEMENT      both knees    knee replacements Bilateral     KYPHOPLASTY  T11, T12, L3 N/A 1/1/2019    Performed by Shaq Castro MD at [facility] OR    LUMBAR SPINE SURGERY       Family History   Problem Relation Age of Onset    Heart disease Mother     Heart disease Father     Cancer Father     Hypertension Father     Stroke Father      Social History     Tobacco Use    Smoking status: Former Smoker    Smokeless tobacco: Never Used   Substance Use Topics    Alcohol use: No    Drug use: No         OBJECTIVE:     Vital Signs (Most Recent)  Temp: 97.3 °F (36.3 °C) (01/23/19 1125)  Pulse: 75 (01/23/19 1125)  Resp: 18 (01/23/19 1125)  BP: (!)  145/84 (01/23/19 1125)  SpO2: 97 % (01/23/19 1125)    Physical Exam:                                                       GENERAL:  Comfortable, in no acute distress.                                 HEENT EXAM:  Nonicteric.  No adenopathy.  Oropharynx is clear.               NECK:  Supple.                                                               LUNGS:  Clear.                                                               CARDIAC:  Regular rate and rhythm.  S1, S2.  No murmur.                      ABDOMEN:  Soft, positive bowel sounds, nontender.  No hepatosplenomegaly or masses.  No rebound or guarding.                                             EXTREMITIES:  No edema.     MENTAL STATUS:  Normal, alert and oriented.      ASSESSMENT/PLAN:     Assessment: Reflux    Plan: EGD    Anesthesia Plan: General    ASA Grade: ASA 2 - Patient with mild systemic disease with no functional limitations    MALLAMPATI SCORE:  I (soft palate, uvula, fauces, and tonsillar pillars visible)

## 2019-01-23 NOTE — DISCHARGE SUMMARY
Discharge Note  Short Stay      SUMMARY     Admit Date: 1/23/2019    Attending Physician: Lenin Morel MD     Discharge Physician: Lenin Morel MD    Discharge Date: 1/23/2019 12:32 PM    Final Diagnosis: Gastric ulcer, unspecified chronicity, unspecified whether gastric ulcer hemorrhage or perforation present [K25.9]  Gastroesophageal reflux disease, esophagitis presence not specified [K21.9]    Disposition: HOME OR SELF CARE    Patient Instructions:   Current Discharge Medication List      START taking these medications    Details   ranitidine (ZANTAC) 300 MG tablet Take 1 tablet (300 mg total) by mouth every evening.  Qty: 30 tablet, Refills: 2         CONTINUE these medications which have NOT CHANGED    Details   ambrisentan (LETAIRIS) 5 MG Tab Take by mouth.      aspirin 81 MG Chew Take by mouth.      BREO ELLIPTA 100-25 mcg/dose diskus inhaler       calcitonin, salmon, (FORTICAL) 200 unit/actuation nasal spray 1 spray by Nasal route once daily.  Qty: 1 Bottle, Refills: 5    Associated Diagnoses: Osteoporosis, unspecified osteoporosis type, unspecified pathological fracture presence      ergocalciferol (ERGOCALCIFEROL) 50,000 unit Cap Take 1 capsule (50,000 Units total) by mouth every 7 days.  Qty: 4 capsule, Refills: 5    Associated Diagnoses: Vitamin D deficiency      furosemide (LASIX) 20 MG tablet TAKE 1 TABLET(20 MG) BY MOUTH TWICE DAILY  Qty: 180 tablet, Refills: 0    Associated Diagnoses: Pulmonary hypertension      gabapentin (NEURONTIN) 300 MG capsule Take 600 mg by mouth 3 (three) times daily.       HYDROcodone-acetaminophen (NORCO)  mg per tablet Take 1 tablet by mouth every 8 (eight) hours as needed for Pain.  Qty: 30 tablet, Refills: 0      HYDROcodone-acetaminophen (NORCO) 5-325 mg per tablet Take 2 tablets by mouth every 6 (six) hours as needed for Pain.  Qty: 30 tablet, Refills: 0      L.acidophil,parac-S.therm-Bif. (RISAQUAD) Cap capsule Take 1 capsule by mouth once daily.       latanoprost 0.005 % ophthalmic solution       lisinopril (PRINIVIL,ZESTRIL) 5 MG tablet Take 1 tablet (5 mg total) by mouth once daily.  Qty: 90 tablet, Refills: 1      metoprolol tartrate (LOPRESSOR) 50 MG tablet Take 1 tablet (50 mg total) by mouth 3 (three) times daily.  Qty: 90 tablet, Refills: 11      pantoprazole (PROTONIX) 40 MG tablet Take 1 tablet (40 mg total) by mouth 2 (two) times daily.  Qty: 60 tablet, Refills: 11      potassium chloride (KLOR-CON) 10 MEQ TbSR TAKE 1 TABLET(10 MEQ) BY MOUTH EVERY DAY  Qty: 30 tablet, Refills: 5      pramipexole (MIRAPEX) 1 MG tablet TAKE 1 TABLET BY MOUTH DAILY EVERY NIGHT AT BEDTIME FOR LEGS CRAMPS.      predniSONE (DELTASONE) 5 MG tablet TAKE 1 TABLET BY MOUTH THREE TIMES DAILY  Qty: 90 tablet, Refills: 5    Associated Diagnoses: Chronic inflammatory arthritis      rosuvastatin (CRESTOR) 20 MG tablet TAKE 1 TABLET(20 MG) BY MOUTH EVERY DAY  Qty: 90 tablet, Refills: 1      sildenafil (REVATIO) 20 mg Tab Take by mouth.      tofacitinib (XELJANZ XR) 11 mg Tb24 Take 1 tablet by mouth every day as directed by physician.  Qty: 30 tablet, Refills: 11    Comments: Refill [id number]  Associated Diagnoses: Chronic inflammatory arthritis      folic acid (FOLVITE) 1 MG tablet Take 1 tablet (1 mg total) by mouth once daily.  Qty: 30 tablet, Refills: 11    Associated Diagnoses: Methotrexate, long term, current use      PROAIR RESPICLICK 90 mcg/actuation AePB              Discharge Procedure Orders (must include Diet, Follow-up, Activity)    Follow Up:  Follow up with PCP as previously scheduled  Resume routine diet.  Activity as tolerated.    No driving day of procedure.

## 2019-01-23 NOTE — TRANSFER OF CARE
"Anesthesia Transfer of Care Note    Patient: Jameel Villeda    Procedure(s) Performed: Procedure(s) (LRB):  EGD (ESOPHAGOGASTRODUODENOSCOPY) (N/A)    Patient location: PACU    Anesthesia Type: general    Transport from OR: Transported from OR on room air with adequate spontaneous ventilation    Post pain: adequate analgesia    Post assessment: no apparent anesthetic complications and tolerated procedure well    Post vital signs: stable    Level of consciousness: awake    Nausea/Vomiting: no nausea/vomiting    Complications: none    Transfer of care protocol was followed      Last vitals:   Visit Vitals  BP (!) 145/84 (BP Location: Right arm, Patient Position: Lying)   Pulse 75   Temp 36.3 °C (97.3 °F)   Resp 18   Ht 5' 7" (1.702 m)   Wt 80.3 kg (177 lb)   SpO2 97%   BMI 27.72 kg/m²     "

## 2019-01-23 NOTE — ANESTHESIA PREPROCEDURE EVALUATION
01/23/2019  Jameel Villeda is a 73 y.o., male.    Anesthesia Evaluation    I have reviewed the Patient Summary Reports.     I have reviewed the Medications.     Review of Systems  Anesthesia Hx:  No problems with previous Anesthesia    Social:  Former Smoker    Cardiovascular:   Hypertension CAD   Angina    Musculoskeletal:  Spine Disorders: lumbar and thoracic Chronic Pain  Thoracic Spine Disorders  Lumbar Spine Disorders       Physical Exam  General:  Well nourished    Airway/Jaw/Neck:  Airway Findings: Mouth Opening: Normal Tongue: Normal  General Airway Assessment: Adult  Mallampati: III  TM Distance: 4 - 6 cm  Jaw/Neck Findings:  Neck ROM: Extension Decreased, Mild  Neck Findings:  Girth Increased       Chest/Lungs:  Chest/Lungs Findings: Clear to auscultation     Heart/Vascular:  Heart Findings: Rate: Normal  Rhythm: Regular Rhythm        Mental Status:  Mental Status Findings:  Cooperative, Alert and Oriented         Anesthesia Plan  Type of Anesthesia, risks & benefits discussed:  Anesthesia Type:  general  Patient's Preference: General  Intra-op Monitoring Plan: standard ASA monitors  Intra-op Monitoring Plan Comments:   Post Op Pain Control Plan:   Post Op Pain Control Plan Comments:   Induction:   IV  Beta Blocker:  Patient is not currently on a Beta-Blocker (No further documentation required).       Informed Consent: Patient understands risks and agrees with Anesthesia plan.  Questions answered. Anesthesia consent signed with patient.  ASA Score: 3     Day of Surgery Review of History & Physical:    H&P update referred to the surgeon.         Ready For Surgery From Anesthesia Perspective.

## 2019-01-24 ENCOUNTER — TELEPHONE (OUTPATIENT)
Dept: CARDIOLOGY | Facility: CLINIC | Age: 74
End: 2019-01-24

## 2019-01-24 NOTE — TELEPHONE ENCOUNTER
----- Message from Ephraim Troncoso MD sent at 1/24/2019 10:12 AM CST -----  Contact: Briana hinson/Carolyn [phone]  TAKE LASIX DAILY FOR 3 DAYS  ----- Message -----  From: Harmony Suarez MA  Sent: 1/24/2019   9:42 AM  To: Ephraim Troncoso MD        ----- Message -----  From: Janae Hidalgo  Sent: 1/24/2019   9:37 AM  To: Aba CORBETT Staff    She is calling to let you know that he has a 5 lb weight gain since last week.  He did not take his lasix yesterday.  Please call her.  Thank you!

## 2019-01-24 NOTE — TELEPHONE ENCOUNTER
----- Message from Janae Hidalgo sent at 1/24/2019  9:37 AM CST -----  Contact: Briana hinson/Carolyn [phone]  She is calling to let you know that he has a 5 lb weight gain since last week.  He did not take his lasix yesterday.  Please call her.  Thank you!

## 2019-02-07 DIAGNOSIS — M19.90 CHRONIC INFLAMMATORY ARTHRITIS: ICD-10-CM

## 2019-02-12 RX ORDER — POTASSIUM CHLORIDE 750 MG/1
TABLET, EXTENDED RELEASE ORAL
Qty: 30 TABLET | Refills: 0 | Status: SHIPPED | OUTPATIENT
Start: 2019-02-12 | End: 2019-03-19 | Stop reason: SDUPTHER

## 2019-02-20 RX ORDER — ROSUVASTATIN CALCIUM 20 MG/1
TABLET, COATED ORAL
Qty: 90 TABLET | Refills: 0 | Status: SHIPPED | OUTPATIENT
Start: 2019-02-20 | End: 2019-03-06 | Stop reason: SDUPTHER

## 2019-03-06 ENCOUNTER — OFFICE VISIT (OUTPATIENT)
Dept: CARDIOLOGY | Facility: CLINIC | Age: 74
End: 2019-03-06
Payer: MEDICARE

## 2019-03-06 VITALS
HEIGHT: 67 IN | HEART RATE: 84 BPM | SYSTOLIC BLOOD PRESSURE: 138 MMHG | OXYGEN SATURATION: 98 % | BODY MASS INDEX: 29.27 KG/M2 | DIASTOLIC BLOOD PRESSURE: 84 MMHG | WEIGHT: 186.5 LBS

## 2019-03-06 DIAGNOSIS — I25.708 CORONARY ARTERY DISEASE OF BYPASS GRAFT OF NATIVE HEART WITH STABLE ANGINA PECTORIS: Primary | ICD-10-CM

## 2019-03-06 DIAGNOSIS — E78.00 HYPERCHOLESTEROLEMIA: ICD-10-CM

## 2019-03-06 DIAGNOSIS — I10 ESSENTIAL HYPERTENSION: Primary | ICD-10-CM

## 2019-03-06 DIAGNOSIS — I10 ESSENTIAL HYPERTENSION: ICD-10-CM

## 2019-03-06 DIAGNOSIS — I34.0 NON-RHEUMATIC MITRAL REGURGITATION: ICD-10-CM

## 2019-03-06 PROCEDURE — 99214 OFFICE O/P EST MOD 30 MIN: CPT | Mod: S$GLB,,, | Performed by: INTERNAL MEDICINE

## 2019-03-06 PROCEDURE — 99214 PR OFFICE/OUTPT VISIT, EST, LEVL IV, 30-39 MIN: ICD-10-PCS | Mod: S$GLB,,, | Performed by: INTERNAL MEDICINE

## 2019-03-06 RX ORDER — METOPROLOL TARTRATE 50 MG/1
50 TABLET ORAL 3 TIMES DAILY
Qty: 270 TABLET | Refills: 1 | Status: SHIPPED | OUTPATIENT
Start: 2019-03-06 | End: 2020-03-05

## 2019-03-06 RX ORDER — FLUTICASONE FUROATE, UMECLIDINIUM BROMIDE AND VILANTEROL TRIFENATATE 100; 62.5; 25 UG/1; UG/1; UG/1
1 POWDER RESPIRATORY (INHALATION) DAILY
COMMUNITY
Start: 2019-01-25

## 2019-03-06 RX ORDER — LISINOPRIL 10 MG/1
10 TABLET ORAL DAILY
Qty: 90 TABLET | Refills: 1 | Status: SHIPPED | OUTPATIENT
Start: 2019-03-06 | End: 2020-03-05

## 2019-03-06 RX ORDER — ROSUVASTATIN CALCIUM 20 MG/1
TABLET, COATED ORAL
Qty: 90 TABLET | Refills: 1 | Status: ON HOLD | OUTPATIENT
Start: 2019-03-06 | End: 2019-05-24

## 2019-03-06 NOTE — PROGRESS NOTES
Subjective:    Patient ID:  Jameel Villeda is a 74 y.o. male who presents for Shortness of Breath (Labs); Hypertension; and Coronary Artery Disease        HPI  S/P STPH WITH SEPSIS, GIB, THEN FX VERTEBRA, RECORDS REVIEWED, MAIN COMPLAINT IS BACK PAIN, HAS INJECTIONS/ NERVE BLOCK 2 DAYS AGO, LESS SOB OFF METHOTREXATE, BP FLUCTUATES, MOSTLY OK 'S, LABS DISCUSSED ,  HB  11.1, CR0.98SEE ROS    Past Medical History:   Diagnosis Date    Acute coronary syndrome     Cancer     skin    CHF (congestive heart failure)     Coronary artery disease     Coronary artery disease involving coronary bypass graft of native heart 6/14/2017    Dyspnea     Heart murmur     Hyperlipidemia     Hypertension     Myofascial pain     PAF (paroxysmal atrial fibrillation)     PHT (pulmonary hypertension)     Polymyalgia rheumatica     Rheumatoid arthritis with rheumatoid factor of multiple sites without organ or systems involvement     Right rib fracture     Valvular regurgitation      Past Surgical History:   Procedure Laterality Date    CARDIAC CATHETERIZATION      Cardiac stents      cataracts Bilateral     CERVICAL SPINE SURGERY      CHOLECYSTECTOMY      CORONARY ANGIOPLASTY      CORONARY ARTERY BYPASS GRAFT  2001    EGD (ESOPHAGOGASTRODUODENOSCOPY) N/A 1/23/2019    [facility]O    EGD (ESOPHAGOGASTRODUODENOSCOPY)-in ICU- on Drip N/A 11/20/2018    [facility] at [facility] ENDO    EYE SURGERY      JOINT REPLACEMENT      both knees    knee replacements Bilateral     KYPHOPLASTY  T11, T12, L3 N/A 1/1/2019    [facility]formed by Shaq Castro [facility] at [facility] OR    LUMBAR SPINE SURGERY       Family History   Problem Relation Age of Onset    Heart disease Mother     Heart disease Father     Cancer Father     Hypertension Father     Stroke Father      Social History     Socioeconomic History    Marital status:      Spouse name: None    Number of children:  None    Years of education: None    Highest education level: None   Social Needs    Financial resource strain: None    Food insecurity - worry: None    Food insecurity - inability: None    Transportation needs - medical: None    Transportation needs - non-medical: None   Occupational History    None   Tobacco Use    Smoking status: Former Smoker    Smokeless tobacco: Never Used   Substance and Sexual Activity    Alcohol use: No    Drug use: No    Sexual activity: None   Other Topics Concern    None   Social History Narrative    None       Review of patient's allergies indicates:   Allergen Reactions    Lorazepam     Warfarin     Ciprofloxacin hcl Rash       Current Outpatient Medications:     ambrisentan (LETAIRIS) 5 MG Tab, Take by mouth., Disp: , Rfl:     calcitonin, salmon, (FORTICAL) 200 unit/actuation nasal spray, 1 spray by Nasal route once daily., Disp: 1 Bottle, Rfl: 5    ergocalciferol (ERGOCALCIFEROL) 50,000 unit Cap, Take 1 capsule (50,000 Units total) by mouth every 7 days., Disp: 4 capsule, Rfl: 5    furosemide (LASIX) 20 MG tablet, TAKE 1 TABLET(20 MG) BY MOUTH TWICE DAILY, Disp: 180 tablet, Rfl: 0    gabapentin (NEURONTIN) 300 MG capsule, Take 600 mg by mouth 3 (three) times daily. , Disp: , Rfl:     HYDROcodone-acetaminophen (NORCO)  mg per tablet, Take 1 tablet by mouth every 8 (eight) hours as needed for Pain., Disp: 30 tablet, Rfl: 0    L.acidophil,parac-S.therm-Bif. (RISAQUAD) Cap capsule, Take 1 capsule by mouth once daily., Disp: , Rfl:     latanoprost 0.005 % ophthalmic solution, , Disp: , Rfl:     metoprolol tartrate (LOPRESSOR) 50 MG tablet, Take 1 tablet (50 mg total) by mouth 3 (three) times daily., Disp: 270 tablet, Rfl: 1    pantoprazole (PROTONIX) 40 MG tablet, Take 1 tablet (40 mg total) by mouth 2 (two) times daily., Disp: 60 tablet, Rfl: 11    potassium chloride (KLOR-CON) 10 MEQ TbSR, TAKE 1 TABLET(10 MEQ) BY MOUTH EVERY DAY, Disp: 30 tablet, Rfl:  0    pramipexole (MIRAPEX) 1 MG tablet, TAKE 1 TABLET BY MOUTH DAILY EVERY NIGHT AT BEDTIME FOR LEGS CRAMPS., Disp: , Rfl:     predniSONE (DELTASONE) 5 MG tablet, TAKE 1 TABLET BY MOUTH THREE TIMES DAILY, Disp: 90 tablet, Rfl: 5    PROAIR RESPICLICK 90 mcg/actuation AePB, , Disp: , Rfl:     ranitidine (ZANTAC) 300 MG tablet, Take 1 tablet (300 mg total) by mouth every evening., Disp: 30 tablet, Rfl: 2    rosuvastatin (CRESTOR) 20 MG tablet, ONE PO Q HS, Disp: 90 tablet, Rfl: 1    sildenafil (REVATIO) 20 mg Tab, Take 20 mg by mouth 3 (three) times daily. , Disp: , Rfl:     tofacitinib (XELJANZ XR) 11 mg Tb24, Take 1 tablet by mouth every day as directed by physician., Disp: 30 tablet, Rfl: 5    TRELEGY ELLIPTA 100-62.5-25 mcg DsDv, once daily. , Disp: , Rfl:     aspirin 81 MG Chew, Take by mouth., Disp: , Rfl:     lisinopril 10 MG tablet, Take 1 tablet (10 mg total) by mouth once daily., Disp: 90 tablet, Rfl: 1    Review of Systems   Constitution: Negative for chills, decreased appetite, diaphoresis, fever, weakness, malaise/fatigue and night sweats.   HENT: Negative for congestion and nosebleeds.    Eyes: Negative for blurred vision and visual disturbance.   Cardiovascular: Negative for chest pain, claudication, cyanosis, dyspnea on exertion (MILD), irregular heartbeat, leg swelling, near-syncope, orthopnea, palpitations, paroxysmal nocturnal dyspnea and syncope.   Respiratory: Negative for cough, hemoptysis, shortness of breath (BETTER) and wheezing (BETTER WITH INHALERS).    Endocrine: Negative for polyuria. Heat intolerance: ALL HIS LIFE.   Hematologic/Lymphatic: Negative for adenopathy and bleeding problem. Does not bruise/bleed easily.   Skin: Negative for color change, itching and nail changes.   Musculoskeletal: Positive for arthritis. Negative for back pain. Joint pain: KNEES. Muscle cramps: LEGS.   Gastrointestinal: Negative for abdominal pain, change in bowel habit, dysphagia, hematemesis,  "jaundice, melena and vomiting.   Genitourinary: Negative for dysuria, flank pain, frequency and hematuria.   Neurological: Negative for brief paralysis, dizziness, focal weakness, light-headedness, loss of balance (MILD) and tremors.   Psychiatric/Behavioral: Negative for altered mental status, depression and memory loss.   Allergic/Immunologic: Negative for hives and persistent infections.        Objective:      Vitals:    03/06/19 1422   BP: 138/84   Pulse: 84   SpO2: 98%   Weight: 84.6 kg (186 lb 8.2 oz)   Height: 5' 7" (1.702 m)   PainSc: 10-Worst pain ever   PainLoc: Back     Body mass index is 29.21 kg/m².    Physical Exam   Constitutional: He is oriented to person, place, and time. He appears well-developed and well-nourished. He is active.   OVER WEIGHT   HENT:   Head: Normocephalic and atraumatic.   Mouth/Throat: Oropharynx is clear and moist and mucous membranes are normal.   Eyes: Conjunctivae and EOM are normal. Pupils are equal, round, and reactive to light.   Neck: Normal range of motion. Neck supple. Normal carotid pulses, no hepatojugular reflux and no JVD present. Carotid bruit is not present. No edema and no erythema present. No thyromegaly present.   Cardiovascular: Normal rate and regular rhythm.  No extrasystoles are present. PMI is not displaced. Exam reveals no gallop, no distant heart sounds, no friction rub and no midsystolic click.   Murmur heard.  High-pitched holosystolic murmur is present at the lower left sternal border and apex.  Pulses:       Carotid pulses are 2+ on the right side, and 2+ on the left side.       Radial pulses are 2+ on the right side, and 2+ on the left side.        Femoral pulses are 2+ on the right side, and 2+ on the left side.       Popliteal pulses are 2+ on the right side, and 2+ on the left side.        Dorsalis pedis pulses are 2+ on the right side, and 2+ on the left side.        Posterior tibial pulses are 2+ on the right side, and 2+ on the left side. "   Pulmonary/Chest: Effort normal and breath sounds normal. No accessory muscle usage. No tachypnea and no bradypnea. No respiratory distress.   Abdominal: Soft. Bowel sounds are normal. He exhibits no distension and no mass. There is no hepatosplenomegaly. There is no CVA tenderness.   Musculoskeletal: Normal range of motion. He exhibits no edema or deformity.   SLOW GAIT, USES A CANE   Lymphadenopathy:     He has no cervical adenopathy.   Neurological: He is alert and oriented to person, place, and time. He has normal strength. He displays no tremor. No cranial nerve deficit ([place]).   Skin: Skin is warm and dry. No cyanosis or erythema. No pallor.   Psychiatric: He has a normal mood and affect. His speech is normal and behavior is normal.               ..    Chemistry        Component Value Date/Time     01/10/2019 1355    K 4.0 01/10/2019 1355     01/10/2019 1355    CO2 24.7 01/10/2019 1355    BUN 24 (H) 01/10/2019 1355    CREATININE 1.20 01/10/2019 1355     (H) 01/10/2019 1355        Component Value Date/Time    CALCIUM 9.8 01/10/2019 1355    ALKPHOS 97 01/10/2019 1355    AST 29 01/10/2019 1355    ALT 37 01/01/2019 0542    BILITOT 0.6 01/10/2019 1355    ESTGFRAFRICA >60 01/02/2019 0602    EGFRNONAA >60 01/02/2019 0602            ..  Lab Results   Component Value Date    CHOL 215 (H) 07/28/2017     Lab Results   Component Value Date    HDL 82 (H) 07/28/2017     Lab Results   Component Value Date    LDLCALC 106.0 07/28/2017     Lab Results   Component Value Date    TRIG 135 07/28/2017     Lab Results   Component Value Date    CHOLHDL 38.1 07/28/2017     ..  Lab Results   Component Value Date    WBC 9.3 01/10/2019    HGB 10.7 (L) 01/10/2019    HCT 35.5 (L) 01/10/2019    MCV 99.2 01/10/2019     01/10/2019       Test(s) Reviewed  I have reviewed the following in detail:  [] Stress test   [] Angiography   [] Echocardiogram   [x] Labs   [x] Other:       Assessment:         ICD-10-CM ICD-9-CM    1. Coronary artery disease of bypass graft of native heart with stable angina pectoris I25.708 414.05     413.9   2. Essential hypertension I10 401.9   3. Non-rheumatic mitral regurgitation I34.0 424.0   4. Hypercholesterolemia E78.00 272.0     Problem List Items Addressed This Visit        Cardiac/Vascular    Coronary artery disease of bypass graft of native heart with stable angina pectoris - Primary    Relevant Orders    Comprehensive metabolic panel    Essential hypertension    Relevant Orders    Comprehensive metabolic panel    Non-rheumatic mitral regurgitation    Relevant Orders    Comprehensive metabolic panel    Hypercholesterolemia    Relevant Orders    Comprehensive metabolic panel    Lipid panel           Plan:     DAILY MEDS, DAILY ASA, INCREASE LISINOPRIL TO 10 MG, WATCH BP,PT HESITANT, ALL CV CLINICALLY STABLE, CLASS 1 ANGINA, NO HF, NO TIA, NO CLINICAL ARRHYTHMIA,CONTINUE CURRENT MEDS, EDUCATION, DIET, EXERCISE, RTC IN 6 MO WITH LABS, EXPLAINED PLAN TO PT AND WIFE      Coronary artery disease of bypass graft of native heart with stable angina pectoris  -     Comprehensive metabolic panel; Future; Expected date: 09/06/2019    Essential hypertension  -     Comprehensive metabolic panel; Future; Expected date: 09/06/2019    Non-rheumatic mitral regurgitation  -     Comprehensive metabolic panel; Future; Expected date: 09/06/2019    Hypercholesterolemia  -     Comprehensive metabolic panel; Future; Expected date: 09/06/2019  -     Lipid panel; Future; Expected date: 09/06/2019    Other orders  -     lisinopril 10 MG tablet; Take 1 tablet (10 mg total) by mouth once daily.  Dispense: 90 tablet; Refill: 1  -     metoprolol tartrate (LOPRESSOR) 50 MG tablet; Take 1 tablet (50 mg total) by mouth 3 (three) times daily.  Dispense: 270 tablet; Refill: 1  -     rosuvastatin (CRESTOR) 20 MG tablet; ONE PO Q HS  Dispense: 90 tablet; Refill: 1    RTC Low level/low impact aerobic exercise 5x's/wk. Heart healthy  diet and risk factor modification.    See labs and med orders.    Aerobic exercise 5x's/wk. Heart healthy diet and risk factor modification.    See labs and med orders.

## 2019-03-19 DIAGNOSIS — M19.90 CHRONIC INFLAMMATORY ARTHRITIS: ICD-10-CM

## 2019-03-19 RX ORDER — POTASSIUM CHLORIDE 750 MG/1
TABLET, EXTENDED RELEASE ORAL
Qty: 30 TABLET | Refills: 2 | Status: SHIPPED | OUTPATIENT
Start: 2019-03-19 | End: 2019-05-30 | Stop reason: SDUPTHER

## 2019-03-19 RX ORDER — PREDNISONE 5 MG/1
TABLET ORAL
Qty: 90 TABLET | Refills: 1 | Status: SHIPPED | OUTPATIENT
Start: 2019-03-19 | End: 2019-05-28 | Stop reason: SDUPTHER

## 2019-05-06 PROBLEM — S32.040A COMPRESSION FRACTURE OF L4 LUMBAR VERTEBRA, CLOSED, INITIAL ENCOUNTER: Status: ACTIVE | Noted: 2019-05-06

## 2019-05-10 ENCOUNTER — TELEPHONE (OUTPATIENT)
Dept: NEUROSURGERY | Facility: CLINIC | Age: 74
End: 2019-05-10

## 2019-05-10 NOTE — TELEPHONE ENCOUNTER
----- Message from Caterina Hooks NP sent at 5/10/2019  3:28 PM CDT -----  He needs a wound check scheduled POD#14 and follow-up at 6 weeks. Thanks!

## 2019-05-13 ENCOUNTER — TELEPHONE (OUTPATIENT)
Dept: CARDIOLOGY | Facility: CLINIC | Age: 74
End: 2019-05-13

## 2019-05-13 PROBLEM — Z79.52 CURRENT CHRONIC USE OF SYSTEMIC STEROIDS: Status: ACTIVE | Noted: 2019-05-13

## 2019-05-13 PROBLEM — A41.9 SEPSIS: Status: ACTIVE | Noted: 2019-05-13

## 2019-05-13 PROBLEM — J96.91 RESPIRATORY FAILURE WITH HYPOXIA: Status: ACTIVE | Noted: 2018-11-18

## 2019-05-13 PROBLEM — I24.89 DEMAND ISCHEMIA: Status: ACTIVE | Noted: 2019-05-13

## 2019-05-13 NOTE — TELEPHONE ENCOUNTER
Please advise: Dr. Moody called from [facility] ED, stated that the patient is septic and has pulmonary HTN. She is requesting to speak with you asap in regards to the patient

## 2019-05-16 PROBLEM — J15.5 E. COLI PNEUMONIA: Status: ACTIVE | Noted: 2019-05-16

## 2019-05-17 PROBLEM — R74.8 ELEVATED LIVER ENZYMES: Status: ACTIVE | Noted: 2019-05-17

## 2019-05-18 PROBLEM — I50.21 ACUTE SYSTOLIC HEART FAILURE: Status: ACTIVE | Noted: 2019-05-18

## 2019-05-22 PROBLEM — R53.81 PHYSICAL DECONDITIONING: Status: ACTIVE | Noted: 2019-05-22

## 2019-05-28 DIAGNOSIS — M19.90 CHRONIC INFLAMMATORY ARTHRITIS: ICD-10-CM

## 2019-05-29 RX ORDER — PREDNISONE 5 MG/1
TABLET ORAL
Qty: 90 TABLET | Refills: 1 | Status: SHIPPED | OUTPATIENT
Start: 2019-05-29 | End: 2019-07-31 | Stop reason: SDUPTHER

## 2019-05-30 RX ORDER — POTASSIUM CHLORIDE 750 MG/1
TABLET, EXTENDED RELEASE ORAL
Qty: 90 TABLET | Refills: 1 | Status: SHIPPED | OUTPATIENT
Start: 2019-05-30 | End: 2019-09-04 | Stop reason: SDUPTHER

## 2019-06-12 RX ORDER — ROSUVASTATIN CALCIUM 20 MG/1
TABLET, COATED ORAL
Qty: 90 TABLET | Refills: 0 | Status: SHIPPED | OUTPATIENT
Start: 2019-06-12 | End: 2019-09-04 | Stop reason: SDUPTHER

## 2019-06-18 ENCOUNTER — OFFICE VISIT (OUTPATIENT)
Dept: NEUROSURGERY | Facility: CLINIC | Age: 74
End: 2019-06-18
Payer: MEDICARE

## 2019-06-18 VITALS — DIASTOLIC BLOOD PRESSURE: 89 MMHG | SYSTOLIC BLOOD PRESSURE: 149 MMHG | HEART RATE: 97 BPM

## 2019-06-18 DIAGNOSIS — M48.062 LUMBAR STENOSIS WITH NEUROGENIC CLAUDICATION: Primary | ICD-10-CM

## 2019-06-18 PROCEDURE — 99024 POSTOP FOLLOW-UP VISIT: CPT | Mod: POP,,, | Performed by: STUDENT IN AN ORGANIZED HEALTH CARE EDUCATION/TRAINING PROGRAM

## 2019-06-18 PROCEDURE — 99999 PR PBB SHADOW E&M-EST. PATIENT-LVL III: ICD-10-PCS | Mod: PBBFAC,,, | Performed by: STUDENT IN AN ORGANIZED HEALTH CARE EDUCATION/TRAINING PROGRAM

## 2019-06-18 PROCEDURE — 99999 PR PBB SHADOW E&M-EST. PATIENT-LVL III: CPT | Mod: PBBFAC,,, | Performed by: STUDENT IN AN ORGANIZED HEALTH CARE EDUCATION/TRAINING PROGRAM

## 2019-06-18 PROCEDURE — 99024 PR POST-OP FOLLOW-UP VISIT: ICD-10-PCS | Mod: POP,,, | Performed by: STUDENT IN AN ORGANIZED HEALTH CARE EDUCATION/TRAINING PROGRAM

## 2019-06-18 PROCEDURE — 99213 OFFICE O/P EST LOW 20 MIN: CPT | Mod: PBBFAC,PN | Performed by: STUDENT IN AN ORGANIZED HEALTH CARE EDUCATION/TRAINING PROGRAM

## 2019-06-18 RX ORDER — OXYCODONE AND ACETAMINOPHEN 7.5; 325 MG/1; MG/1
1 TABLET ORAL EVERY 6 HOURS PRN
Qty: 40 TABLET | Refills: 0
Start: 2019-06-18 | End: 2019-06-18 | Stop reason: SDUPTHER

## 2019-06-18 RX ORDER — OXYCODONE AND ACETAMINOPHEN 7.5; 325 MG/1; MG/1
1 TABLET ORAL EVERY 6 HOURS PRN
Qty: 40 TABLET | Refills: 0 | Status: SHIPPED | OUTPATIENT
Start: 2019-06-18

## 2019-06-18 NOTE — PROGRESS NOTES
REFERRING PHYSICIAN:    No ref. provider found  N/A    Postoperative visit #2.    CLINICAL PROGRESS:   Jameel Villeda is now  Postop minimally invasive laminectomy for adjacent segment disease with spinal stenosis  He is complex pathology he has osteoporosis with multiple thoracolumbar compression fractures  He has had multiple hospitalizations and was operated on an inpatient basis  He has significant improvement in his neurogenic claudication and leg pain  He is very happy with this outcome  .  He has some residual left buttock and leg pain intermittently with standing ambulation, however much less significant    Postoperatively he was readmitted he had multiple comorbidities he has pulmonary pathology he was septic quite ill  He has recovered he is ambulating independently today taking care of ADLs  He worked with home health for outpatient physical therapy    From a standpoint of lumbar stenosis neurogenic claudication he is a great improvement    MEDICATIONS:                   List reviewed, see chart for dosing details.    ALLERGIES:       Review of patient's allergies indicates:   Allergen Reactions    Lorazepam     Warfarin     Ciprofloxacin hcl Rash       PHYSICAL EXAMINATION:          VITAL SIGNS:   BP (!) 149/89   Pulse 97     GENERAL:  Patient is well developed, well nourished, calm, collected, and in no apparent distress.  Incision is well healed    NEUROLOGICAL:    LE strength                     R          L     IP           5           5     Quad      5           5     TA          5           5      EHL      5           5      GS        5           5      .  Sensation is intact to light touch/PP.  Gait is slow but steady a and independent        No flowsheet data found.      ASSESSMENT/PLAN:  Doing well postop minimally invasive decompression  Great improvement in neurogenic claudication  He has multiple comorbidities including osteoporosis with multiple fragility fractures chronically  His  symptoms are stable he is not complaining of neck pain he has improved function and ADLs  He follows with rheumatology is being managed for osteoporosis, there where of the risk of progression or subsequent fractures  He was offered referrals to physical therapy, we discussed pain management if needed  He verbalizes adequate understanding wished to continue his home ambulation  All questions answered             Advised to call the office Iif any questions or concerns arise.    This note was partially dictated using voice recognition software, so please excuse any errors that were not corrected.

## 2019-07-03 ENCOUNTER — TELEPHONE (OUTPATIENT)
Dept: HEMATOLOGY/ONCOLOGY | Facility: CLINIC | Age: 74
End: 2019-07-03

## 2019-07-03 NOTE — TELEPHONE ENCOUNTER
----- Message from Livia Stern LPN sent at 7/3/2019  1:55 PM CDT -----  Contact: wife-Zee Jack      ----- Message -----  From: Zee Cullen  Sent: 7/3/2019  10:03 AM  To: Jae JUAN Staff    Pt wife calling states Dr Lenard Aguero recommend pt see a blood for blood cancer. Would like a call to setup an appointment and also advise if need a referral....[phone]

## 2019-07-03 NOTE — TELEPHONE ENCOUNTER
----- Message from Livia Stern LPN sent at 7/3/2019  1:55 PM CDT -----  Contact: wife-Zee Jack      ----- Message -----  From: Zee Cullen  Sent: 7/3/2019  10:03 AM  To: Jae JUAN Staff    Pt wife calling states Dr Lenard Aguero recommend pt see a blood for blood cancer. Would like a call to setup an appointment and also advise if need a referral....[phone]

## 2019-07-03 NOTE — TELEPHONE ENCOUNTER
----- Message from Livia Stern LPN sent at 7/3/2019  1:55 PM CDT -----  Contact: wife-Zee Jack      ----- Message -----  From: Zee Cullen  Sent: 7/3/2019  10:03 AM  To: Jae JUAN Staff    Pt wife calling states Dr Lenard Aguero recommend pt see a blood for blood cancer. Would like a call to setup an appointment and also advise if need a referral....[phone]

## 2019-07-11 ENCOUNTER — OFFICE VISIT (OUTPATIENT)
Dept: HEMATOLOGY/ONCOLOGY | Facility: CLINIC | Age: 74
End: 2019-07-11
Payer: MEDICARE

## 2019-07-11 ENCOUNTER — LAB VISIT (OUTPATIENT)
Dept: LAB | Facility: HOSPITAL | Age: 74
End: 2019-07-11
Attending: INTERNAL MEDICINE
Payer: MEDICARE

## 2019-07-11 VITALS
HEIGHT: 67 IN | RESPIRATION RATE: 22 BRPM | DIASTOLIC BLOOD PRESSURE: 85 MMHG | WEIGHT: 196.63 LBS | OXYGEN SATURATION: 93 % | SYSTOLIC BLOOD PRESSURE: 145 MMHG | TEMPERATURE: 99 F | HEART RATE: 87 BPM | BODY MASS INDEX: 30.86 KG/M2

## 2019-07-11 DIAGNOSIS — E53.8 B12 DEFICIENCY: ICD-10-CM

## 2019-07-11 DIAGNOSIS — I48.0 PAF (PAROXYSMAL ATRIAL FIBRILLATION): ICD-10-CM

## 2019-07-11 DIAGNOSIS — M54.16 LUMBAR RADICULOPATHY: Primary | ICD-10-CM

## 2019-07-11 DIAGNOSIS — D50.0 ANEMIA DUE TO CHRONIC BLOOD LOSS: ICD-10-CM

## 2019-07-11 DIAGNOSIS — M05.711 RHEUMATOID ARTHRITIS INVOLVING RIGHT SHOULDER WITH POSITIVE RHEUMATOID FACTOR: ICD-10-CM

## 2019-07-11 DIAGNOSIS — M54.16 LUMBAR RADICULOPATHY: ICD-10-CM

## 2019-07-11 DIAGNOSIS — I25.708 CORONARY ARTERY DISEASE OF BYPASS GRAFT OF NATIVE HEART WITH STABLE ANGINA PECTORIS: ICD-10-CM

## 2019-07-11 DIAGNOSIS — I27.20 PULMONARY HTN: ICD-10-CM

## 2019-07-11 DIAGNOSIS — I10 ESSENTIAL HYPERTENSION: ICD-10-CM

## 2019-07-11 LAB
ALBUMIN SERPL BCP-MCNC: 4.3 G/DL (ref 3.5–5.2)
ALP SERPL-CCNC: 92 U/L (ref 38–145)
ALT SERPL W/O P-5'-P-CCNC: 35 U/L (ref 10–44)
ANION GAP SERPL CALC-SCNC: 6 MMOL/L (ref 8–16)
AST SERPL-CCNC: 49 U/L (ref 17–59)
BASOPHILS # BLD AUTO: 0.03 K/UL (ref 0–0.2)
BASOPHILS NFR BLD: 0.3 % (ref 0–1.9)
BILIRUB SERPL-MCNC: 0.3 MG/DL (ref 0.2–1.3)
BUN SERPL-MCNC: 18 MG/DL (ref 9–21)
CALCIUM SERPL-MCNC: 9.8 MG/DL (ref 8.4–10.2)
CHLORIDE SERPL-SCNC: 100 MMOL/L (ref 95–110)
CO2 SERPL-SCNC: 31 MMOL/L (ref 22–31)
CREAT SERPL-MCNC: 1 MG/DL (ref 0.5–1.4)
DIFFERENTIAL METHOD: ABNORMAL
EOSINOPHIL # BLD AUTO: 0.1 K/UL (ref 0–0.5)
EOSINOPHIL NFR BLD: 0.5 % (ref 0–8)
ERYTHROCYTE [DISTWIDTH] IN BLOOD BY AUTOMATED COUNT: 15 % (ref 11.5–14.5)
EST. GFR  (AFRICAN AMERICAN): >60 ML/MIN/1.73 M^2
EST. GFR  (NON AFRICAN AMERICAN): >60 ML/MIN/1.73 M^2
FERRITIN SERPL-MCNC: 106 NG/ML (ref 18–464)
FOLATE SERPL-MCNC: 13.7 NG/ML (ref 4–24)
GLUCOSE SERPL-MCNC: 127 MG/DL (ref 70–110)
HAPTOGLOB SERPL-MCNC: 206 MG/DL (ref 30–200)
HCT VFR BLD AUTO: 37.9 % (ref 40–54)
HGB BLD-MCNC: 11.7 G/DL (ref 14–18)
IMM GRANULOCYTES # BLD AUTO: 0.06 K/UL (ref 0–0.04)
IMM GRANULOCYTES NFR BLD AUTO: 0.6 % (ref 0–0.5)
IRON SATN MFR SERPL: 9 % (ref 20–50)
IRON SERPL-MCNC: 36 UG/DL (ref 45–160)
LYMPHOCYTES # BLD AUTO: 0.8 K/UL (ref 1–4.8)
LYMPHOCYTES NFR BLD: 7.9 % (ref 18–48)
MCH RBC QN AUTO: 31 PG (ref 27–31)
MCHC RBC AUTO-ENTMCNC: 30.9 G/DL (ref 32–36)
MCV RBC AUTO: 101 FL (ref 82–98)
MONOCYTES # BLD AUTO: 0.8 K/UL (ref 0.3–1)
MONOCYTES NFR BLD: 8.4 % (ref 4–15)
NEUTROPHILS # BLD AUTO: 8 K/UL (ref 1.8–7.7)
NEUTROPHILS NFR BLD: 82.3 % (ref 38–73)
NRBC BLD-RTO: 0 /100 WBC
PATH REV BLD -IMP: NORMAL
PLATELET # BLD AUTO: 228 K/UL (ref 150–350)
PMV BLD AUTO: 9.9 FL (ref 9.2–12.9)
POTASSIUM SERPL-SCNC: 4.5 MMOL/L (ref 3.5–5.1)
PROT SERPL-MCNC: 7.2 G/DL (ref 6–8.4)
RBC # BLD AUTO: 3.77 M/UL (ref 4.6–6.2)
SODIUM SERPL-SCNC: 137 MMOL/L (ref 136–145)
TOTAL IRON BINDING CAPACITY: 381 UG/DL (ref 261–462)
VIT B12 SERPL-MCNC: >1000 PG/ML (ref 210–950)
WBC # BLD AUTO: 9.74 K/UL (ref 3.9–12.7)

## 2019-07-11 PROCEDURE — 83540 ASSAY OF IRON: CPT | Mod: PN

## 2019-07-11 PROCEDURE — 82607 VITAMIN B-12: CPT | Mod: PN

## 2019-07-11 PROCEDURE — 80053 COMPREHEN METABOLIC PANEL: CPT

## 2019-07-11 PROCEDURE — 84165 PATHOLOGIST INTERPRETATION SPE: ICD-10-PCS | Mod: 26,,, | Performed by: PATHOLOGY

## 2019-07-11 PROCEDURE — 83010 ASSAY OF HAPTOGLOBIN QUANT: CPT

## 2019-07-11 PROCEDURE — 82728 ASSAY OF FERRITIN: CPT | Mod: PN

## 2019-07-11 PROCEDURE — 86334 IMMUNOFIX E-PHORESIS SERUM: CPT | Mod: 26,,, | Performed by: PATHOLOGY

## 2019-07-11 PROCEDURE — 86334 PATHOLOGIST INTERPRETATION IFE: ICD-10-PCS | Mod: 26,,, | Performed by: PATHOLOGY

## 2019-07-11 PROCEDURE — 83010 ASSAY OF HAPTOGLOBIN QUANT: CPT | Mod: PN

## 2019-07-11 PROCEDURE — 99999 PR PBB SHADOW E&M-EST. PATIENT-LVL III: ICD-10-PCS | Mod: PBBFAC,,, | Performed by: INTERNAL MEDICINE

## 2019-07-11 PROCEDURE — 82607 VITAMIN B-12: CPT

## 2019-07-11 PROCEDURE — 85025 COMPLETE CBC W/AUTO DIFF WBC: CPT

## 2019-07-11 PROCEDURE — 83520 IMMUNOASSAY QUANT NOS NONAB: CPT | Mod: 59

## 2019-07-11 PROCEDURE — 82728 ASSAY OF FERRITIN: CPT

## 2019-07-11 PROCEDURE — 82746 ASSAY OF FOLIC ACID SERUM: CPT

## 2019-07-11 PROCEDURE — 83921 ORGANIC ACID SINGLE QUANT: CPT

## 2019-07-11 PROCEDURE — 84165 PROTEIN E-PHORESIS SERUM: CPT | Mod: 26,,, | Performed by: PATHOLOGY

## 2019-07-11 PROCEDURE — 99204 PR OFFICE/OUTPT VISIT, NEW, LEVL IV, 45-59 MIN: ICD-10-PCS | Mod: S$PBB,,, | Performed by: INTERNAL MEDICINE

## 2019-07-11 PROCEDURE — 99213 OFFICE O/P EST LOW 20 MIN: CPT | Mod: PBBFAC,PN | Performed by: INTERNAL MEDICINE

## 2019-07-11 PROCEDURE — 82746 ASSAY OF FOLIC ACID SERUM: CPT | Mod: PN

## 2019-07-11 PROCEDURE — 84165 PROTEIN E-PHORESIS SERUM: CPT

## 2019-07-11 PROCEDURE — 83540 ASSAY OF IRON: CPT

## 2019-07-11 PROCEDURE — 36415 COLL VENOUS BLD VENIPUNCTURE: CPT | Mod: PN

## 2019-07-11 PROCEDURE — 99204 OFFICE O/P NEW MOD 45 MIN: CPT | Mod: S$PBB,,, | Performed by: INTERNAL MEDICINE

## 2019-07-11 PROCEDURE — 99999 PR PBB SHADOW E&M-EST. PATIENT-LVL III: CPT | Mod: PBBFAC,,, | Performed by: INTERNAL MEDICINE

## 2019-07-11 PROCEDURE — 86334 IMMUNOFIX E-PHORESIS SERUM: CPT

## 2019-07-11 PROCEDURE — 80053 COMPREHEN METABOLIC PANEL: CPT | Mod: PN

## 2019-07-11 PROCEDURE — 85025 COMPLETE CBC W/AUTO DIFF WBC: CPT | Mod: PN

## 2019-07-11 RX ORDER — TRAZODONE HYDROCHLORIDE 100 MG/1
100 TABLET ORAL NIGHTLY
COMMUNITY

## 2019-07-11 NOTE — LETTER
July 11, 2019      Lenard Aguero MD  [address] [facility] [address]           [facility] [address] [address]  Phone: [phone]  Fax: [phone]          Patient: Jameel Villeda   MR Number: [id number]   YOB: 1945   Date of Visit: 7/11/2019       Dear Dr. Lenard Aguero:    Thank you for referring Jameel Villeda to me for evaluation. Attached you will find relevant portions of my assessment and plan of care.    If you have questions, please do not hesitate to call me. I look forward to following Jameel Villeda along with you.    Sincerely,    Saba Rowe MD    Enclosure  CC:  No Recipients    If you would like to receive this communication electronically, please contact [email or web address] or [phone] to request more information on [facility] [facility] [employer] Link access.    For providers and/or their staff who would like to refer a patient to [facility], please contact us through our one-stop-shop provider referral line, [facility], at [phone].    If you feel you have received this communication in error or would no longer like to receive these types of communications, please e-mail [email or web address]

## 2019-07-11 NOTE — PROGRESS NOTES
Subjective:       Patient ID: Jameel Villeda is a 74 y.o. male.    Chief Complaint: ref. By DR Lenard Aguero for evaluation of compression fracture   showing thoracic compression farctures.   had laminectomy in 12/2018 then multiple hospital stays with ventilatory issues . CAP, has ulmonary htn   pain is releived now but still occ sob from Phtn  Wasn't told anything about cancer while in syph x 2 uptill 5.2019    HPI:     Social History     Socioeconomic History    Marital status:      Spouse name: Not on file    Number of children: Not on file    Years of education: Not on file    Highest education level: Not on file   Occupational History    Not on file   Social Needs    Financial resource strain: Not on file    Food insecurity:     Worry: Not on file     Inability: Not on file    Transportation needs:     Medical: Not on file     Non-medical: Not on file   Tobacco Use    Smoking status: Former Smoker    Smokeless tobacco: Never Used   Substance and Sexual Activity    Alcohol use: No    Drug use: No    Sexual activity: Not on file   Lifestyle    Physical activity:     Days per week: Not on file     Minutes per session: Not on file    Stress: Not on file   Relationships    Social connections:     Talks on phone: Not on file     Gets together: Not on file     Attends Zoroastrianism service: Not on file     Active member of club or organization: Not on file     Attends meetings of clubs or organizations: Not on file     Relationship status: Not on file   Other Topics Concern    Not on file   Social History Narrative    Not on file     Family History   Problem Relation Age of Onset    Heart disease Mother     Heart disease Father     Cancer Father     Hypertension Father     Stroke Father      Past Surgical History:   Procedure Laterality Date    ANGIOGRAM, CORONARY, INCLUDING BYPASS GRAFT, WITH LEFT HEART CATHETERIZATION N/A 5/21/2019    Performed [facility] at [facility] CATH     CARDIAC CATHETERIZATION      Cardiac stents      cataracts Bilateral     CERVICAL SPINE SURGERY      CHOLECYSTECTOMY      Coronary angiogram and simultaneous LHC/RHC-RM # 432 Bilateral 5/21/2019    Performed by Ephraim [facility]roncoso [facility] at [facility] [place] CATH    CORONARY ANGIOPLASTY      CORONARY ARTERY BYPASS GRAFT  2001    EGD (ESOPHAGOGASTRODUODENOSCOPY) N/A 1/23/2019    [facility] ENDO    EGD (ESOPHAGOGASTRODUODENOSCOPY)-in ICU- on Drip N/A 11/20/2018    Performed by Lenin Morel MD at [facility] [place] ENDO    EYE SURGERY      JOINT REPLACEMENT      both knees    knee replacements Bilateral     KYPHOPLASTY  T11, T12, L3 N/A 1/1/2019    Performed by Shaq Castro MD at [facility] [place] OR    LAMINECTOMY, SPINE, MINIMALLY INVASIVE L3-4 decompression laminectomies/foraminotomies N/A 5/9/2019    Performed by Josemanuel Keys MD at [facility] [place] OR    LUMBAR SPINE SURGERY       Past Medical History:   Diagnosis Date    Acute coronary syndrome     Cancer     skin    CHF (congestive heart failure)     Coronary artery disease     Coronary artery disease involving coronary bypass graft of native heart 6/14/2017    Dyspnea     Heart murmur     Hyperlipidemia     Hypertension     Myofascial pain     PAF (paroxysmal atrial fibrillation)     PHT (pulmonary hypertension)     Polymyalgia rheumatica     Rheumatoid arthritis with rheumatoid factor of multiple sites without organ or systems involvement     Right rib fracture     Valvular regurgitation        Current Outpatient Medications:     aspirin 81 MG Chew, Take 1 tablet (81 mg total) by mouth once daily., Disp: , Rfl: 0    cyclobenzaprine (FLEXERIL) 10 MG tablet, Take 1 tablet (10 mg total) by mouth 3 (three) times daily as needed for Muscle spasms., Disp: 45 tablet, Rfl: 1    furosemide (LASIX) 20 MG tablet, TAKE 1 TABLET(20 MG) BY MOUTH TWICE DAILY, Disp: 180 tablet, Rfl: 0    latanoprost 0.005 % ophthalmic solution, Place 1 drop into both eyes once  daily. , Disp: , Rfl:     lisinopril 10 MG tablet, Take 1 tablet (10 mg total) by mouth once daily. (Patient taking differently: Take 10 mg by mouth daily as needed. ), Disp: 90 tablet, Rfl: 1    metoprolol tartrate (LOPRESSOR) 50 MG tablet, Take 1 tablet (50 mg total) by mouth 3 (three) times daily., Disp: 270 tablet, Rfl: 1    oxyCODONE-acetaminophen (PERCOCET) 7.5-325 mg per tablet, Take 1 tablet by mouth every 6 (six) hours as needed for Pain., Disp: 40 tablet, Rfl: 0    pantoprazole (PROTONIX) 40 MG tablet, Take 1 tablet (40 mg total) by mouth 2 (two) times daily., Disp: 60 tablet, Rfl: 11    potassium chloride (KLOR-CON) 10 MEQ TbSR, TAKE 1 TABLET(10 MEQ) BY MOUTH EVERY DAY, Disp: 90 tablet, Rfl: 1    pramipexole (MIRAPEX) 1 MG tablet, TAKE 1 TABLET BY MOUTH DAILY EVERY NIGHT AT BEDTIME FOR LEGS CRAMPS., Disp: , Rfl:     predniSONE (DELTASONE) 5 MG tablet, TAKE 1 TABLET BY MOUTH THREE TIMES DAILY, Disp: 90 tablet, Rfl: 1    PROAIR RESPICLICK 90 mcg/actuation AePB, Inhale 1 puff into the lungs every 6 (six) hours as needed (for wheezing). , Disp: , Rfl:     rosuvastatin (CRESTOR) 20 MG tablet, Take 20 mg by mouth every evening., Disp: , Rfl:     rosuvastatin (CRESTOR) 20 MG tablet, TAKE 1 TABLET(20 MG) BY MOUTH EVERY DAY, Disp: 90 tablet, Rfl: 0    traZODone (DESYREL) 100 MG tablet, Take 100 mg by mouth every evening., Disp: , Rfl:     TRELEGY ELLIPTA 100-62.5-25 mcg DsDv, Inhale 1 puff into the lungs once daily. , Disp: , Rfl:     benzonatate (TESSALON PERLES) 100 MG capsule, Take 1 capsule (100 mg total) by mouth 3 (three) times daily as needed for Cough., Disp: 30 capsule, Rfl: 1    calcitonin, salmon, (FORTICAL) 200 unit/actuation nasal spray, 1 spray by Nasal route once daily., Disp: 1 Bottle, Rfl: 5    ergocalciferol (ERGOCALCIFEROL) 50,000 unit Cap, Take 1 capsule (50,000 Units total) by mouth every 7 days., Disp: 4 capsule, Rfl: 5    gabapentin (NEURONTIN) 300 MG capsule, Take 2 capsules  (600 mg total) by mouth 3 (three) times daily., Disp: 180 capsule, Rfl: 0    L.acidophil,parac-S.therm-Bif. (RISAQUAD) Cap capsule, Take 1 capsule by mouth once daily., Disp: , Rfl:     magnesium 30 mg Tab, Take by mouth once daily., Disp: , Rfl:     ranitidine (ZANTAC) 300 MG tablet, TAKE 1 TABLET(300 MG) BY MOUTH EVERY EVENING, Disp: 30 tablet, Rfl: 0  Review of patient's allergies indicates:   Allergen Reactions    Lorazepam     Warfarin     Ciprofloxacin hcl Rash         REVIEW OF SYSTEMS:     CONSTITUTIONAL: The patient denies any weight change. There is no apparent    change in appetite, fever, night sweats, headaches, fatigue, dizziness, or    weakness.      SKIN: Denies rash, issues with nails, non-healing sores, bleeding, blotching    skin or abnormal bruising. Denies new moles or changes to existing moles.      BREASTS: There is no swelling around breasts or nipple discharge.    EYES: Denies eye pain, blurred vision, swelling, redness or discharge.      ENT AND MOUTH: Denies runny nose, stuffiness, sinus trouble or sores. Denies    nosebleeds. Denies, hoarseness, change in voice or swelling in front of the    neck.      CARDIOVASCULAR: Denies chest pain, discomfort or palpitations. Denies neck    swelling or episodes of passing out.      RESPIRATORY: sob+, bruce. On exertion. occ cough    GI: Denies trouble swallowing, indigestion, heartburn, abdominal pain, nausea,    vomiting, diarrhea, altered bowel habits, blood in stool, discoloration of    stools, change in nature of stool, bloating, increased abdominal girth.      GENITOURINARY: No discharge. No pelvic pain or lumps. No rash around groin or  lesions. No urinary frequency, hesitation, painful urination or blood in    urine. Denies incontinence. No problems with intercourse.      MUSCULOSKELETAL: Denies neck or back pain. Denies weakness in arms or legs,    joint problems or distended inflamed veins in legs. Denies swelling or abnormal  glands.       NEUROLOGICAL: Denies tingling, numbness, altered mentation changes to nerve    function in the face, weakness to one or both of the body. Denies changes to    gait and denies multiple falls or accidents.        PHYSICAL EXAM:     Vitals:    07/11/19 1350   BP: (!) 145/85   Pulse: 87   Resp: (!) 22   Temp: 98.8 °F (37.1 °C)       GENERAL: Comfortable looking patient. Patient is in no distress.  Awake, alert and oriented to time, person and place.  No anxiety, or agitation.      HEENT: Normal conjunctivae and eyelids. WNL.  PERRLA 3 to 4 mm. No icterus, no pallor, no congestion, and no discharge noted.     NECK:  Supple. Trachea is central.  No crepitus.  No JVD or masses.    RESPIRATORY:  No intercostal retractions.  No dullness to percussion.  Chest is clear to auscultation.  No rales, rhonchi or wheezes.  No crepitus.  Good air entry bilaterally.    CARDIOVASCULAR:  S1 and S2 are normally heard without murmurs or gallops.  All peripheral pulses are present.    ABDOMEN:  Normal abdomen.  No hepatosplenomegaly.  No free fluid.  Bowel sounds are present.  No hernia noted. No masses.  No rebound or tenderness.  No guarding or rigidity.  Umbilicus is midline.    LYMPHATICS:  No axillary, cervical, supraclavicular, submental, or inguinal lymphadenopathy.    SKIN/MUSCULOSKELETAL:  There is no evidence of excoriation marks or ecchmosis.  No rashes.  No cyanosis.  No clubbing.  No joint or skeletal deformities noted.  Normal range of motion.    NEUROLOGIC:  Higher functions are appropriate.  No cranial nerve deficits.  Normal khang.  Normal strength.  Motor and sensory functions are normal.  Deep tendon reflexes are normal.    GENITAL/RECTAL:  Exams are deferred.      Laboratory:     CBC:  Lab Results   Component Value Date    WBC 10.51 05/30/2019    RBC 3.67 (L) 05/30/2019    HGB 11.2 (L) 05/30/2019    HCT 35.9 (L) 05/30/2019    MCV 98 05/30/2019    MCH 30.5 05/30/2019    MCHC 31.2 (L) 05/30/2019    RDW 16.1 (H)  05/30/2019     05/30/2019    MPV 10.7 05/30/2019    GRAN 7.2 05/30/2019    GRAN 68.1 05/30/2019    LYMPH 2.0 05/30/2019    LYMPH 19.3 05/30/2019    MONO 0.9 05/30/2019    MONO 8.9 05/30/2019    EOS 0.0 05/30/2019    BASO 0.05 05/30/2019    EOSINOPHIL 0.4 05/30/2019    BASOPHIL 0.5 05/30/2019       BMP: BMP  Lab Results   Component Value Date     05/30/2019    K 4.5 05/30/2019     05/30/2019    CO2 26 05/30/2019    BUN 18 05/30/2019    CREATININE 1.01 05/30/2019    CALCIUM 9.4 05/30/2019    ANIONGAP 8 05/30/2019    ESTGFRAFRICA >60 05/30/2019    EGFRNONAA >60 05/30/2019       LFT:   Lab Results   Component Value Date    ALT 60 (H) 05/30/2019    AST 53 05/30/2019    ALKPHOS 182 (H) 05/30/2019    BILITOT 0.4 05/30/2019         Assessment/Plan:       1. Lumbar radiculopathy    2. Anemia due to chronic blood loss    3. B12 deficiency        Compression fractures:   will get spep/ sumi these were doen at [facility]'s orders   will need to w/u for myeloma   so far no indication of MM   FLC ratio, spep, sumi, path review to be done   had a CT chest without contrast in may without evidience of malignancy.   he had a pulm nodule   cont pcp for all above mentioned medical issues/ meds and f./u   cont cad f/u  Advance Care Planning   Pt had advancd care planning completed at the hospital when he was intubated in the hospital and set on paper

## 2019-07-12 LAB
ALBUMIN SERPL ELPH-MCNC: 4.04 G/DL (ref 3.35–5.55)
ALPHA1 GLOB SERPL ELPH-MCNC: 0.39 G/DL (ref 0.17–0.41)
ALPHA2 GLOB SERPL ELPH-MCNC: 0.97 G/DL (ref 0.43–0.99)
B-GLOBULIN SERPL ELPH-MCNC: 0.8 G/DL (ref 0.5–1.1)
GAMMA GLOB SERPL ELPH-MCNC: 0.71 G/DL (ref 0.67–1.58)
INTERPRETATION SERPL IFE-IMP: NORMAL
KAPPA LC SER QL IA: 1.8 MG/DL (ref 0.33–1.94)
KAPPA LC/LAMBDA SER IA: 1.59 (ref 0.26–1.65)
LAMBDA LC SER QL IA: 1.13 MG/DL (ref 0.57–2.63)
PATHOLOGIST INTERPRETATION IFE: NORMAL
PATHOLOGIST INTERPRETATION SPE: NORMAL
PROT SERPL-MCNC: 6.9 G/DL (ref 6–8.4)
STFR SERPL-MCNC: 7.9 MG/L (ref 1.8–4.6)

## 2019-07-15 LAB — PATH REV BLD -IMP: NORMAL

## 2019-07-16 LAB — METHYLMALONATE SERPL-SCNC: 0.22 UMOL/L

## 2019-07-25 ENCOUNTER — OFFICE VISIT (OUTPATIENT)
Dept: HEMATOLOGY/ONCOLOGY | Facility: CLINIC | Age: 74
End: 2019-07-25
Payer: MEDICARE

## 2019-07-25 VITALS
TEMPERATURE: 98 F | HEIGHT: 67 IN | WEIGHT: 192.44 LBS | DIASTOLIC BLOOD PRESSURE: 79 MMHG | OXYGEN SATURATION: 93 % | RESPIRATION RATE: 22 BRPM | SYSTOLIC BLOOD PRESSURE: 140 MMHG | BODY MASS INDEX: 30.2 KG/M2 | HEART RATE: 82 BPM

## 2019-07-25 DIAGNOSIS — I10 ESSENTIAL HYPERTENSION: ICD-10-CM

## 2019-07-25 DIAGNOSIS — D50.8 IRON DEFICIENCY ANEMIA SECONDARY TO INADEQUATE DIETARY IRON INTAKE: ICD-10-CM

## 2019-07-25 DIAGNOSIS — I25.10 CORONARY ARTERY DISEASE INVOLVING NATIVE CORONARY ARTERY OF NATIVE HEART WITHOUT ANGINA PECTORIS: Primary | ICD-10-CM

## 2019-07-25 PROCEDURE — 99214 OFFICE O/P EST MOD 30 MIN: CPT | Mod: S$PBB,,, | Performed by: INTERNAL MEDICINE

## 2019-07-25 PROCEDURE — 99999 PR PBB SHADOW E&M-EST. PATIENT-LVL IV: CPT | Mod: PBBFAC,,, | Performed by: INTERNAL MEDICINE

## 2019-07-25 PROCEDURE — 99214 PR OFFICE/OUTPT VISIT, EST, LEVL IV, 30-39 MIN: ICD-10-PCS | Mod: S$PBB,,, | Performed by: INTERNAL MEDICINE

## 2019-07-25 PROCEDURE — 99214 OFFICE O/P EST MOD 30 MIN: CPT | Mod: PBBFAC,PN | Performed by: INTERNAL MEDICINE

## 2019-07-25 PROCEDURE — 99999 PR PBB SHADOW E&M-EST. PATIENT-LVL IV: ICD-10-PCS | Mod: PBBFAC,,, | Performed by: INTERNAL MEDICINE

## 2019-07-25 RX ORDER — HEPARIN 100 UNIT/ML
5 SYRINGE INTRAVENOUS
Status: CANCELLED | OUTPATIENT
Start: 2019-07-25

## 2019-07-25 RX ORDER — SODIUM CHLORIDE 0.9 % (FLUSH) 0.9 %
10 SYRINGE (ML) INJECTION
Status: CANCELLED | OUTPATIENT
Start: 2019-07-25

## 2019-07-25 RX ORDER — SODIUM CHLORIDE 9 MG/ML
INJECTION, SOLUTION INTRAVENOUS CONTINUOUS
Status: CANCELLED | OUTPATIENT
Start: 2019-07-25

## 2019-07-31 ENCOUNTER — OFFICE VISIT (OUTPATIENT)
Dept: RHEUMATOLOGY | Facility: CLINIC | Age: 74
End: 2019-07-31
Payer: MEDICARE

## 2019-07-31 VITALS
SYSTOLIC BLOOD PRESSURE: 139 MMHG | DIASTOLIC BLOOD PRESSURE: 79 MMHG | BODY MASS INDEX: 30.01 KG/M2 | WEIGHT: 191.63 LBS

## 2019-07-31 DIAGNOSIS — Z79.899 ENCOUNTER FOR LONG-TERM (CURRENT) DRUG USE: ICD-10-CM

## 2019-07-31 DIAGNOSIS — M81.0 OSTEOPOROSIS, UNSPECIFIED OSTEOPOROSIS TYPE, UNSPECIFIED PATHOLOGICAL FRACTURE PRESENCE: ICD-10-CM

## 2019-07-31 DIAGNOSIS — M19.90 CHRONIC INFLAMMATORY ARTHRITIS: Primary | ICD-10-CM

## 2019-07-31 PROCEDURE — 99214 PR OFFICE/OUTPT VISIT, EST, LEVL IV, 30-39 MIN: ICD-10-PCS | Mod: S$GLB,,, | Performed by: INTERNAL MEDICINE

## 2019-07-31 PROCEDURE — 99214 OFFICE O/P EST MOD 30 MIN: CPT | Mod: S$GLB,,, | Performed by: INTERNAL MEDICINE

## 2019-07-31 RX ORDER — PREDNISONE 2.5 MG/1
2.5 TABLET ORAL 2 TIMES DAILY
Qty: 60 TABLET | Refills: 5 | Status: SHIPPED | OUTPATIENT
Start: 2019-07-31

## 2019-07-31 RX ORDER — AMBRISENTAN 5 MG/1
TABLET, FILM COATED ORAL
COMMUNITY
Start: 2019-07-26

## 2019-07-31 RX ORDER — HYDROXYCHLOROQUINE SULFATE 200 MG/1
200 TABLET, FILM COATED ORAL DAILY
Qty: 30 TABLET | Refills: 5 | Status: SHIPPED | OUTPATIENT
Start: 2019-07-31 | End: 2019-09-04 | Stop reason: ALTCHOICE

## 2019-07-31 RX ORDER — TOFACITINIB 11 MG/1
TABLET, FILM COATED, EXTENDED RELEASE ORAL
COMMUNITY
Start: 2019-07-26 | End: 2019-10-19 | Stop reason: SDUPTHER

## 2019-07-31 NOTE — PATIENT INSTRUCTIONS
Hydroxychloroquine tablets  What is this medicine?  HYDROXYCHLOROQUINE (noel drox ee KLOR oh kwin) is used to treat rheumatoid arthritis and systemic lupus erythematosus. It is also used to treat malaria.  How should I use this medicine?  Take this medicine by mouth with a glass of water. Follow the directions on the prescription label. If this medicine upsets your stomach take it with food or milk. Take your doses at regular intervals. Do not take your medicine more often than directed.  Talk to your pediatrician regarding the use of this medicine in children. Special care may be needed.  What side effects may I notice from receiving this medicine?  Side effects that you should report to your doctor or health care professional as soon as possible:  · allergic reactions like skin rash, itching or hives, swelling of the face, lips, or tongue  · change in vision  · fever, infection  · hearing loss or ringing  · muscle weakness, tremor, or numbness  · redness, blistering, peeling or loosening of the skin, including inside the mouth  · seizures  · unusual bleeding or bruising  · unusually weak or tired  Side effects that usually do not require medical attention (report to your doctor or health care professional if they continue or are bothersome):  · change in coloration of the mouth or skin  · dizziness  · hair loss, lightening  · headache  · irritability, nervousness, nightmares  · loss of appetite  · stomach upset, diarrhea  What may interact with this medicine?  · antacids  · botulinum toxins  · digoxin  · kaolin  · penicillamine  What if I miss a dose?  If you miss a dose, take it as soon as you can. If it is almost time for your next dose, take only that dose. Do not take double or extra doses.  Where should I keep my medicine?  Keep out of the reach of children. In children, this medicine can cause overdose with small doses.  Store at room temperature between 15 and 30 degrees C (59 and 86 degrees F). Protect  from moisture and light. Throw away any unused medicine after the expiration date.  What should I tell my health care provider before I take this medicine?  They need to know if you have any of these conditions:  · alcoholism  · anemia or other blood disorder  · eye disease  · glucose 6-phosphate dehydrogenase (G6PD) deficiency  · liver disease  · porphyria  · psoriasis  · an unusual or allergic reaction to chloroquine, hydroxychloroquine, other medicines, foods, dyes, or preservatives  · pregnant or trying to get pregnant  · breast-feeding  What should I watch for while using this medicine?  Visit your doctor or health care professional for regular check ups. Tell your doctor if your symptoms do not improve. Arthritis symptoms may take several weeks to improve. If you are taking this medicine for a long time, you will need important blood work done. You will also need to have your eyes checked as directed.  This medicine can make you more sensitive to the sun. Keep out of the sun. If you cannot avoid being in the sun, wear protective clothing and use sunscreen. Do not use sun lamps or tanning beds/booths.  Avoid antacids and kaolin containing products for 2 hours before and after taking a dose of this medicine.  NOTE:This sheet is a summary. It may not cover all possible information. If you have questions about this medicine, talk to your doctor, pharmacist, or health care provider. Copyright© 2017 [employer]

## 2019-07-31 NOTE — PROGRESS NOTES
[facility] RHEUMATOLOGY           Follow-up visit    Notes dictated via Dragon to EPIC. Please forgive any unintended errors.  Subjective:       Patient ID:   NAME: Jameel Villeda : 1945     74 y.o. male    Referring Doc: No ref. provider found  Other Physicians:    Chief Complaint:  Rheumatoid Arthritis      HPI/Interval History:    The patient has been experiencing increasing pain and swelling in the joints of both hands, especially the right hand. He has been having difficulty performing his ADLs for the last month. He has morning stiffness that lasts until early afternoon and sometimes all day.  He has been off prednisone since his diagnosis of spinal compression fractures. He is being worked up by heme-onc for the possibility of an underlying malignancy such as multiple myeloma.    ROS:   GEN:    no fever, night sweats or weight loss  SKIN:   no rashes, erythema, bruising, or swelling, no Raynauds, no photosensitivity  HEENT: no changes in vision, no mouth ulcers, no sicca symptoms, no scalp tenderness, no jaw claudication.  CV:      no CP, PND, HO, orthopnea, no palpitations  PULM: no SOB, cough, hemoptysis, sputum or pleuritic pain  GI:       no GERD, no dysphagia, no hematemesis, no abdominal pain, nausea, vomiting, constipation, diarrhea, melanotic stools, BRBPR  :      no hematuria, dysuria  NEURO: no paresthesias, headaches, acute visual disturbances  MUSCULOSKELETAL:  Pain and swelling with redness and warmth involving both wrists and hands  PSYCH:   No insomnia, no increased anxiety, no increased depression    Past Medical/Surgical History:  Past Medical History:   Diagnosis Date    Acute coronary syndrome     Cancer     skin    CHF (congestive heart failure)     Coronary artery disease     Coronary artery disease involving coronary bypass graft of native heart 2017    Dyspnea     Heart murmur     Hyperlipidemia     Hypertension     Myofascial pain     PAF (paroxysmal atrial  fibrillation)     PHT (pulmonary hypertension)     Polymyalgia rheumatica     Rheumatoid arthritis with rheumatoid factor of multiple sites without organ or systems involvement     Right rib fracture     Valvular regurgitation      Past Surgical History:   Procedure Laterality Date    ANGIOGRAM, CORONARY, INCLUDING BYPASS GRAFT, WITH LEFT HEART CATHETERIZATION N/A 5/21/2019    Performed by [facility]raim [facility]coso [facility] at [facility] CATH    CARDIAC CATHETERIZATION      Cardiac stents      cataracts Bilateral     CERVICAL SPINE SURGERY      CHOLECYSTECTOMY      Coronary angiogram and simultaneous LHC/RHC-RM # 432 Bilateral 5/21/2019    Performed by [facility]o [facility] at [facility] CATH    CORONARY ANGIOPLASTY      CORONARY ARTERY BYPASS GRAFT  2001    EGD (ESOPHAGOGASTRODUODENOSCOPY) N/A 1/23/2019    [facility] ENDO    EGD (ESOPHAGOGASTRODUODENOSCOPY)-in ICU- on Drip N/A 11/20/2018    Performed by Lenin [facility]l [facility] at [facility] ENDO    EYE SURGERY      JOINT REPLACEMENT      both knees    knee replacements Bilateral     KYPHOPLASTY  T11, T12, L3 N/A 1/1/2019    Performed by Shaq Castro [facility] at [facility] OR    LAMINECTOMY, SPINE, MINIMALLY INVASIVE L3-4 decompression laminectomies/foraminotomies N/A 5/9/2019    Performed by Josemanuel Keys [facility] at [facility] OR    LUMBAR SPINE SURGERY         Allergies:  Review of patient's allergies indicates:   Allergen Reactions    Lorazepam     Warfarin     Ciprofloxacin hcl Rash       Social/Family History:  Social History     Socioeconomic History    Marital status:      Spouse name: Not on file    Number of children: Not on file    Years of education: Not on file    Highest education level: Not on file   Occupational History    Not on file   Social Needs    Financial resource strain: Not on file    Food insecurity:     Worry: Not on file     Inability: Not on file    Transportation needs:     Medical: Not on file     Non-medical: Not on file    Tobacco Use    Smoking status: Former Smoker    Smokeless tobacco: Never Used   Substance and Sexual Activity    Alcohol use: No    Drug use: No    Sexual activity: Not on file   Lifestyle    Physical activity:     Days per week: Not on file     Minutes per session: Not on file    Stress: Not on file   Relationships    Social connections:     Talks on phone: Not on file     Gets together: Not on file     Attends Taoist service: Not on file     Active member of club or organization: Not on file     Attends meetings of clubs or organizations: Not on file     Relationship status: Not on file   Other Topics Concern    Not on file   Social History Narrative    Not on file     Family History   Problem Relation Age of Onset    Heart disease Mother     Heart disease Father     Cancer Father     Hypertension Father     Stroke Father      FAMILY HISTORY: negative for Connective Tissue Disease      Medications:    Current Outpatient Medications:     aspirin 81 MG Chew, Take 1 tablet (81 mg total) by mouth once daily., Disp: , Rfl: 0    benzonatate (TESSALON PERLES) 100 MG capsule, Take 1 capsule (100 mg total) by mouth 3 (three) times daily as needed for Cough., Disp: 30 capsule, Rfl: 1    calcitonin, salmon, (FORTICAL) 200 unit/actuation nasal spray, 1 spray by Nasal route once daily., Disp: 1 Bottle, Rfl: 5    cyclobenzaprine (FLEXERIL) 10 MG tablet, Take 1 tablet (10 mg total) by mouth 3 (three) times daily as needed for Muscle spasms., Disp: 45 tablet, Rfl: 1    ergocalciferol (ERGOCALCIFEROL) 50,000 unit Cap, Take 1 capsule (50,000 Units total) by mouth every 7 days., Disp: 4 capsule, Rfl: 5    furosemide (LASIX) 20 MG tablet, TAKE 1 TABLET(20 MG) BY MOUTH TWICE DAILY, Disp: 180 tablet, Rfl: 0    gabapentin (NEURONTIN) 300 MG capsule, Take 2 capsules (600 mg total) by mouth 3 (three) times daily., Disp: 180 capsule, Rfl: 0    L.acidophil,parac-S.therm-Bif. (RISAQUAD) Cap capsule, Take 1 capsule  by mouth once daily., Disp: , Rfl:     latanoprost 0.005 % ophthalmic solution, Place 1 drop into both eyes once daily. , Disp: , Rfl:     LETAIRIS 5 mg Tab, , Disp: , Rfl:     lisinopril 10 MG tablet, Take 1 tablet (10 mg total) by mouth once daily. (Patient taking differently: Take 10 mg by mouth daily as needed. ), Disp: 90 tablet, Rfl: 1    magnesium 30 mg Tab, Take by mouth once daily., Disp: , Rfl:     metoprolol tartrate (LOPRESSOR) 50 MG tablet, Take 1 tablet (50 mg total) by mouth 3 (three) times daily., Disp: 270 tablet, Rfl: 1    oxyCODONE-acetaminophen (PERCOCET) 7.5-325 mg per tablet, Take 1 tablet by mouth every 6 (six) hours as needed for Pain., Disp: 40 tablet, Rfl: 0    pantoprazole (PROTONIX) 40 MG tablet, Take 1 tablet (40 mg total) by mouth 2 (two) times daily., Disp: 60 tablet, Rfl: 11    potassium chloride (KLOR-CON) 10 MEQ TbSR, TAKE 1 TABLET(10 MEQ) BY MOUTH EVERY DAY, Disp: 90 tablet, Rfl: 1    pramipexole (MIRAPEX) 1 MG tablet, TAKE 1 TABLET BY MOUTH DAILY EVERY NIGHT AT BEDTIME FOR LEGS CRAMPS., Disp: , Rfl:     predniSONE (DELTASONE) 2.5 MG tablet, Take 1 tablet (2.5 mg total) by mouth 2 (two) times daily., Disp: 60 tablet, Rfl: 5    PROAIR RESPICLICK 90 mcg/actuation AePB, Inhale 1 puff into the lungs every 6 (six) hours as needed (for wheezing). , Disp: , Rfl:     ranitidine (ZANTAC) 300 MG tablet, TAKE 1 TABLET(300 MG) BY MOUTH EVERY EVENING, Disp: 30 tablet, Rfl: 0    rosuvastatin (CRESTOR) 20 MG tablet, TAKE 1 TABLET(20 MG) BY MOUTH EVERY DAY, Disp: 90 tablet, Rfl: 0    traZODone (DESYREL) 100 MG tablet, Take 100 mg by mouth every evening., Disp: , Rfl:     TRELEGY ELLIPTA 100-62.5-25 mcg DsDv, Inhale 1 puff into the lungs once daily. , Disp: , Rfl:     XELJANZ XR 11 mg Tb24, , Disp: , Rfl:     hydroxychloroquine (PLAQUENIL) 200 mg tablet, Take 1 tablet (200 mg total) by mouth once daily., Disp: 30 tablet, Rfl: 5      Objective:     Vitals:  Blood pressure 139/79,  weight 86.9 kg (191 lb 9.6 oz).    Physical Examination:   GEN: wn/wd male in no apparent distress  SKIN: no rashes, no sclerodactyly, no Raynaud's, no periungual erythema, no digital tip ulcerations, no nailbed pitting  HEAD: no alopecia, no scalp tenderness, no temporal artery tenderness or induration.  EYES: no pallor, no icterus, PERRLA  ENT:  no thrush, no mucosal dryness or ulcerations, adequate oral hygiene & dentition.  NECK: supple x 6, no masses, no thyromegaly, no lymphadenopathy.  CV:   S1 and S2 regular, no murmurs, gallop or rubs  CHEST: Normal respiratory effort;  normal breath sounds/no adventitious sounds. No signs of consolidation.  ABD: non-tender and non-distended; soft; normal bowel sounds; no rebound/guarding or tenderness. No hepatosplenomegaly.  Musculoskeletal:  Active synovitis of the right wrist, the right second, third, and fourth MCPs, left second and third MCP,and scattered PIP joints bilaterally. There is progressive subluxation of the second and third digits on the right hand. There is loss of flexion of the right wrist beyond 45°. No active synovitis is detected in the large joints.  EXTREM: no clubbing, cyanosis or edema. normal pulses.  NEURO:  grossly intact; motor/sensory WNL; no tremors  PSYCH:  normal mood, affect and behavior    Labs:   Lab Results   Component Value Date    WBC 9.74 07/11/2019    HGB 11.7 (L) 07/11/2019    HCT 37.9 (L) 07/11/2019     (H) 07/11/2019     07/11/2019   CMP@  Sodium   Date Value Ref Range Status   07/11/2019 137 136 - 145 mmol/L Final   01/10/2019 138 134 - 144 mmol/L      Potassium   Date Value Ref Range Status   07/11/2019 4.5 3.5 - 5.1 mmol/L Final     Chloride   Date Value Ref Range Status   07/11/2019 100 95 - 110 mmol/L Final   01/10/2019 100 98 - 110 mmol/L      CO2   Date Value Ref Range Status   07/11/2019 31 22 - 31 mmol/L Final     Glucose   Date Value Ref Range Status   07/11/2019 127 (H) 70 - 110 mg/dL Final     Comment:      The ADA recommends the following guidelines for fasting glucose:  Normal:       less than 100 mg/dL  Prediabetes:  100 mg/dL to 125 mg/dL  Diabetes:     126 mg/dL or higher     01/10/2019 124 (H) 70 - 99 mg/dL      BUN, Bld   Date Value Ref Range Status   07/11/2019 18 9 - 21 mg/dL Final     Creatinine   Date Value Ref Range Status   07/11/2019 1.00 0.50 - 1.40 mg/dL Final   01/10/2019 1.20 0.60 - 1.40 mg/dL      Calcium   Date Value Ref Range Status   07/11/2019 9.8 8.4 - 10.2 mg/dL Final     Total Protein   Date Value Ref Range Status   07/11/2019 7.2 6.0 - 8.4 g/dL Final     Albumin   Date Value Ref Range Status   07/11/2019 4.3 3.5 - 5.2 g/dL Final   01/10/2019 3.9 3.1 - 4.7 g/dL      Total Bilirubin   Date Value Ref Range Status   07/11/2019 0.3 0.2 - 1.3 mg/dL Final     Alkaline Phosphatase   Date Value Ref Range Status   07/11/2019 92 38 - 145 U/L Final     AST   Date Value Ref Range Status   07/11/2019 49 17 - 59 U/L Final     ALT   Date Value Ref Range Status   07/11/2019 35 10 - 44 U/L Final     CRP   Date Value Ref Range Status   01/10/2019 0.49 0.00 - 1.40 mg/dL      Rheumatoid Factor   Date Value Ref Range Status   09/13/2018 <10.0 0.0 - 13.9 IU/mL      Comment:     Performed at: [facility], [facility] [id number] [place], [place], [id number] Maurer MD, Phone:  [phone]     Uric Acid   Date Value Ref Range Status   11/19/2018 3.7 3.4 - 7.0 mg/dL Final       Radiology/Diagnostic Studies:    None    Assessment/Discussion/Plan:   74 y.o. male with chronic inflammatory arthritis-inadequate control on Xeljanz alone.    PLAN:  I have discussed the need for additional treatment. I will not restart methotrexate but have any displays substituted hydroxychloroquine. This should present less of a problem with his hematologic problems.  I have restarted prednisone though at half the previous dose. He will take prednisone 2.5 mg twice daily. I will attempt to wean prednisone off once  hydroxychloroquine is effective. Literature on that medication was provided.    I have ordered blood testing to be done 2 weeks prior to his next appointment.    RTC:  I will see him back in 3-4 months or sooner if needed    Electronically signed by Milan Bernabe MD

## 2019-08-01 ENCOUNTER — INFUSION (OUTPATIENT)
Dept: INFUSION THERAPY | Facility: HOSPITAL | Age: 74
End: 2019-08-01
Attending: INTERNAL MEDICINE
Payer: MEDICARE

## 2019-08-01 VITALS
RESPIRATION RATE: 23 BRPM | HEART RATE: 72 BPM | DIASTOLIC BLOOD PRESSURE: 67 MMHG | BODY MASS INDEX: 30.04 KG/M2 | OXYGEN SATURATION: 93 % | WEIGHT: 191.38 LBS | TEMPERATURE: 98 F | SYSTOLIC BLOOD PRESSURE: 136 MMHG | HEIGHT: 67 IN

## 2019-08-01 DIAGNOSIS — D50.8 IRON DEFICIENCY ANEMIA SECONDARY TO INADEQUATE DIETARY IRON INTAKE: Primary | ICD-10-CM

## 2019-08-01 PROCEDURE — 96365 THER/PROPH/DIAG IV INF INIT: CPT | Mod: PN

## 2019-08-01 PROCEDURE — 25000003 PHARM REV CODE 250: Mod: PN | Performed by: INTERNAL MEDICINE

## 2019-08-01 PROCEDURE — 63600175 PHARM REV CODE 636 W HCPCS: Mod: JG,PN | Performed by: INTERNAL MEDICINE

## 2019-08-01 PROCEDURE — A4216 STERILE WATER/SALINE, 10 ML: HCPCS | Mod: PN | Performed by: INTERNAL MEDICINE

## 2019-08-01 RX ORDER — SODIUM CHLORIDE 0.9 % (FLUSH) 0.9 %
10 SYRINGE (ML) INJECTION
Status: CANCELLED | OUTPATIENT
Start: 2019-08-08

## 2019-08-01 RX ORDER — DIPHENHYDRAMINE HYDROCHLORIDE 50 MG/ML
50 INJECTION INTRAMUSCULAR; INTRAVENOUS ONCE AS NEEDED
Status: CANCELLED
Start: 2019-08-08

## 2019-08-01 RX ORDER — HEPARIN 100 UNIT/ML
5 SYRINGE INTRAVENOUS
Status: CANCELLED | OUTPATIENT
Start: 2019-08-08

## 2019-08-01 RX ORDER — METHYLPREDNISOLONE SOD SUCC 125 MG
125 VIAL (EA) INJECTION ONCE AS NEEDED
Status: CANCELLED
Start: 2019-08-01

## 2019-08-01 RX ORDER — METHYLPREDNISOLONE SOD SUCC 125 MG
125 VIAL (EA) INJECTION ONCE AS NEEDED
Status: CANCELLED
Start: 2019-08-08

## 2019-08-01 RX ORDER — SODIUM CHLORIDE 9 MG/ML
INJECTION, SOLUTION INTRAVENOUS CONTINUOUS
Status: CANCELLED | OUTPATIENT
Start: 2019-08-08

## 2019-08-01 RX ORDER — FAMOTIDINE 10 MG/ML
20 INJECTION INTRAVENOUS ONCE AS NEEDED
Status: CANCELLED
Start: 2019-08-01

## 2019-08-01 RX ORDER — FAMOTIDINE 10 MG/ML
20 INJECTION INTRAVENOUS ONCE AS NEEDED
Status: CANCELLED
Start: 2019-08-08

## 2019-08-01 RX ORDER — DIPHENHYDRAMINE HYDROCHLORIDE 50 MG/ML
50 INJECTION INTRAMUSCULAR; INTRAVENOUS ONCE AS NEEDED
Status: CANCELLED
Start: 2019-08-01

## 2019-08-01 RX ORDER — SODIUM CHLORIDE 0.9 % (FLUSH) 0.9 %
10 SYRINGE (ML) INJECTION
Status: DISCONTINUED | OUTPATIENT
Start: 2019-08-01 | End: 2019-08-01 | Stop reason: HOSPADM

## 2019-08-01 RX ORDER — SODIUM CHLORIDE 9 MG/ML
INJECTION, SOLUTION INTRAVENOUS
Status: COMPLETED | OUTPATIENT
Start: 2019-08-01 | End: 2019-08-01

## 2019-08-01 RX ADMIN — SODIUM CHLORIDE: 9 INJECTION, SOLUTION INTRAVENOUS at 03:08

## 2019-08-01 RX ADMIN — FERRIC CARBOXYMALTOSE INJECTION 750 MG: 50 INJECTION, SOLUTION INTRAVENOUS at 03:08

## 2019-08-01 RX ADMIN — Medication 10 ML: at 03:08

## 2019-08-01 NOTE — PLAN OF CARE
Problem: Adult Inpatient Plan of Care  Goal: Plan of Care Review  Outcome: Ongoing (interventions implemented as appropriate)  Pt tolerated infusion well this shift. VSS post 30 minute observation. Pt is safe for discharge. AVS given to patient.

## 2019-08-01 NOTE — PLAN OF CARE
Problem: Activity Intolerance  Goal: Enhanced Capacity and Energy    Intervention: Optimize Activity Tolerance  Pt in this shift for injectafer. Pt c/o SOB and fatigue/weakness. Pt also states he has 8/10 pain due to a few broken ribs. PIV started to right wrist; flushes easily and blood return noted. All questions and concerns addressed at this time. Will continue to monitor.

## 2019-08-08 ENCOUNTER — INFUSION (OUTPATIENT)
Dept: INFUSION THERAPY | Facility: HOSPITAL | Age: 74
End: 2019-08-08
Attending: INTERNAL MEDICINE
Payer: MEDICARE

## 2019-08-08 VITALS
TEMPERATURE: 98 F | SYSTOLIC BLOOD PRESSURE: 113 MMHG | DIASTOLIC BLOOD PRESSURE: 69 MMHG | HEIGHT: 67 IN | RESPIRATION RATE: 20 BRPM | HEART RATE: 98 BPM | BODY MASS INDEX: 29.48 KG/M2 | WEIGHT: 187.81 LBS

## 2019-08-08 DIAGNOSIS — D50.8 IRON DEFICIENCY ANEMIA SECONDARY TO INADEQUATE DIETARY IRON INTAKE: Primary | ICD-10-CM

## 2019-08-08 PROCEDURE — 96365 THER/PROPH/DIAG IV INF INIT: CPT | Mod: PN

## 2019-08-08 PROCEDURE — 63600175 PHARM REV CODE 636 W HCPCS: Mod: JG,PN | Performed by: INTERNAL MEDICINE

## 2019-08-08 PROCEDURE — 25000003 PHARM REV CODE 250: Mod: PN | Performed by: INTERNAL MEDICINE

## 2019-08-08 PROCEDURE — A4216 STERILE WATER/SALINE, 10 ML: HCPCS | Mod: PN | Performed by: INTERNAL MEDICINE

## 2019-08-08 RX ORDER — METHYLPREDNISOLONE SOD SUCC 125 MG
125 VIAL (EA) INJECTION ONCE AS NEEDED
Status: CANCELLED
Start: 2019-08-15

## 2019-08-08 RX ORDER — SODIUM CHLORIDE 0.9 % (FLUSH) 0.9 %
10 SYRINGE (ML) INJECTION
Status: DISCONTINUED | OUTPATIENT
Start: 2019-08-08 | End: 2019-08-08 | Stop reason: HOSPADM

## 2019-08-08 RX ORDER — HEPARIN 100 UNIT/ML
5 SYRINGE INTRAVENOUS
Status: CANCELLED | OUTPATIENT
Start: 2019-08-15

## 2019-08-08 RX ORDER — DIPHENHYDRAMINE HYDROCHLORIDE 50 MG/ML
50 INJECTION INTRAMUSCULAR; INTRAVENOUS ONCE AS NEEDED
Status: CANCELLED
Start: 2019-08-15

## 2019-08-08 RX ORDER — FAMOTIDINE 10 MG/ML
20 INJECTION INTRAVENOUS ONCE AS NEEDED
Status: CANCELLED
Start: 2019-08-15

## 2019-08-08 RX ORDER — FAMOTIDINE 10 MG/ML
20 INJECTION INTRAVENOUS ONCE AS NEEDED
Status: DISCONTINUED | OUTPATIENT
Start: 2019-08-08 | End: 2019-08-08 | Stop reason: HOSPADM

## 2019-08-08 RX ORDER — DIPHENHYDRAMINE HYDROCHLORIDE 50 MG/ML
50 INJECTION INTRAMUSCULAR; INTRAVENOUS ONCE AS NEEDED
Status: DISCONTINUED | OUTPATIENT
Start: 2019-08-08 | End: 2019-08-08 | Stop reason: HOSPADM

## 2019-08-08 RX ORDER — METHYLPREDNISOLONE SOD SUCC 125 MG
125 VIAL (EA) INJECTION ONCE AS NEEDED
Status: DISCONTINUED | OUTPATIENT
Start: 2019-08-08 | End: 2019-08-08 | Stop reason: HOSPADM

## 2019-08-08 RX ORDER — SODIUM CHLORIDE 0.9 % (FLUSH) 0.9 %
10 SYRINGE (ML) INJECTION
Status: CANCELLED | OUTPATIENT
Start: 2019-08-15

## 2019-08-08 RX ORDER — SODIUM CHLORIDE 9 MG/ML
INJECTION, SOLUTION INTRAVENOUS
Status: COMPLETED | OUTPATIENT
Start: 2019-08-08 | End: 2019-08-08

## 2019-08-08 RX ORDER — OXYBUTYNIN CHLORIDE 10 MG/1
10 TABLET, EXTENDED RELEASE ORAL
COMMUNITY
Start: 2019-08-08

## 2019-08-08 RX ORDER — SODIUM CHLORIDE 9 MG/ML
INJECTION, SOLUTION INTRAVENOUS CONTINUOUS
Status: CANCELLED | OUTPATIENT
Start: 2019-08-15

## 2019-08-08 RX ADMIN — Medication 10 ML: at 04:08

## 2019-08-08 RX ADMIN — SODIUM CHLORIDE: 9 INJECTION, SOLUTION INTRAVENOUS at 04:08

## 2019-08-08 RX ADMIN — SODIUM CHLORIDE: 9 INJECTION, SOLUTION INTRAVENOUS at 03:08

## 2019-08-08 RX ADMIN — FERRIC CARBOXYMALTOSE INJECTION 750 MG: 50 INJECTION, SOLUTION INTRAVENOUS at 03:08

## 2019-08-08 NOTE — PLAN OF CARE
Problem: Adult Inpatient Plan of Care  Goal: Plan of Care Review  Outcome: Ongoing (interventions implemented as appropriate)     08/08/19 6185   Plan of Care Review   Plan of Care Reviewed With patient;spouse     Pt tolerated Injectafer well today. Monitored for 30 min post treatment. Vitals remain stable. AVS printed. Reviewed follow-up appointments. All questions were answered, ambulated independently at d/c with spouse.

## 2019-08-08 NOTE — PLAN OF CARE
Problem: Activity Intolerance  Goal: Enhanced Capacity and Energy    Intervention: Optimize Activity Tolerance     08/08/19 7730   Identify and Manage Contributors to Fall Injury Risk   Self-Care Promotion BADL personal objects within reach;BADL personal routines maintained;safe use of adaptive equipment encouraged   Monitor/Manage Chemotherapy Gastrointestinal Effects   Environmental Support rest periods encouraged

## 2019-08-12 ENCOUNTER — TELEPHONE (OUTPATIENT)
Dept: HEMATOLOGY/ONCOLOGY | Facility: CLINIC | Age: 74
End: 2019-08-12

## 2019-08-12 NOTE — TELEPHONE ENCOUNTER
"Spoke with pt's wife.  Pt had last dose of iv injectafer on 8/8/19.  Per Dr. Rowe's note pt needs to be seen 1 mth post injectafer with labs in plan.  Pt's wife requested order be sent to the "[facility]."  Ordes faxed per wife's request and pt's appt made for 9/12/19.  Pt's wife verbalized understanding of dates and times, and need to have labs drawn 1 week prior to appt.  "

## 2019-09-04 ENCOUNTER — OFFICE VISIT (OUTPATIENT)
Dept: CARDIOLOGY | Facility: CLINIC | Age: 74
End: 2019-09-04
Payer: MEDICARE

## 2019-09-04 VITALS
SYSTOLIC BLOOD PRESSURE: 124 MMHG | WEIGHT: 186.5 LBS | HEART RATE: 86 BPM | HEIGHT: 67 IN | DIASTOLIC BLOOD PRESSURE: 62 MMHG | OXYGEN SATURATION: 96 % | BODY MASS INDEX: 29.27 KG/M2

## 2019-09-04 DIAGNOSIS — E78.00 HYPERCHOLESTEROLEMIA: ICD-10-CM

## 2019-09-04 DIAGNOSIS — I34.0 NON-RHEUMATIC MITRAL REGURGITATION: ICD-10-CM

## 2019-09-04 DIAGNOSIS — I10 ESSENTIAL HYPERTENSION: ICD-10-CM

## 2019-09-04 DIAGNOSIS — I25.708 CORONARY ARTERY DISEASE OF BYPASS GRAFT OF NATIVE HEART WITH STABLE ANGINA PECTORIS: Primary | ICD-10-CM

## 2019-09-04 DIAGNOSIS — I27.20 PULMONARY HTN: ICD-10-CM

## 2019-09-04 DIAGNOSIS — E87.6 HYPOKALEMIA: ICD-10-CM

## 2019-09-04 PROCEDURE — 99214 PR OFFICE/OUTPT VISIT, EST, LEVL IV, 30-39 MIN: ICD-10-PCS | Mod: S$GLB,,, | Performed by: INTERNAL MEDICINE

## 2019-09-04 PROCEDURE — 99214 OFFICE O/P EST MOD 30 MIN: CPT | Mod: S$GLB,,, | Performed by: INTERNAL MEDICINE

## 2019-09-04 RX ORDER — ROSUVASTATIN CALCIUM 20 MG/1
TABLET, COATED ORAL
Qty: 90 TABLET | Refills: 1 | Status: SHIPPED | OUTPATIENT
Start: 2019-09-04

## 2019-09-04 RX ORDER — SILDENAFIL CITRATE 20 MG/1
20 TABLET ORAL 3 TIMES DAILY
COMMUNITY

## 2019-09-04 RX ORDER — POTASSIUM CHLORIDE 750 MG/1
TABLET, EXTENDED RELEASE ORAL
Qty: 90 TABLET | Refills: 1 | Status: SHIPPED | OUTPATIENT
Start: 2019-09-04

## 2019-09-04 NOTE — PROGRESS NOTES
Subjective:    Patient ID:  Jameel Villeda is a 74 y.o. male who presents for Atrial Fibrillation (labs); Coronary Artery Disease; and Hypertension        HPI    Multiple hospital admissions, sepsis, cath ok, RECORDS REVIEWED AND DISCUSSED, STILL FATIGUED, LOW FE, HAD INFUSION,LDL 62, K 3.4, NO TIA SX, NO SYNCOPE,  SEE ROS  Past Medical History:   Diagnosis Date    Acute coronary syndrome     Cancer     skin    CHF (congestive heart failure)     Coronary artery disease     Coronary artery disease involving coronary bypass graft of native heart 6/14/2017    Dyspnea     Heart murmur     Hyperlipidemia     Hypertension     Myofascial pain     PAF (paroxysmal atrial fibrillation)     PHT (pulmonary hypertension)     Polymyalgia rheumatica     Rheumatoid arthritis with rheumatoid factor of multiple sites without organ or systems involvement     Right rib fracture     Valvular regurgitation      Past Surgical History:   Procedure Laterality Date    ANGIOGRAM, CORONARY, INCLUDING BYPASS GRAFT, WITH LEFT HEART CATHETERIZATION N/A 5/21/2019    Per[facility] by [facility] at [facility] CATH    CARDIAC CATHETERIZATION      Cardiac stents      cataracts Bilateral     CERVICAL SPINE SURGERY      CHOLECYSTECTOMY      Coronary angiogram and simultaneous LHC/RHC-RM # 432 Bilateral 5/21/2019    Per[facility] by [facility] at [facility] CATH    CORONARY ANGIOPLASTY      CORONARY ARTERY BYPASS GRAFT  2001    EGD (ESOPHAGOGASTRODUODENOSCOPY) N/A 1/23/2019    Performed by Lenin [facility]l [facility] at [facility] ENDO    EGD (ESOPHAGOGASTRODUODENOSCOPY)-in ICU- on Drip N/A 11/20/2018    Performed by Lenin [facility] at [facility] ENDO    EYE SURGERY      JOINT REPLACEMENT      both knees    knee replacements Bilateral     KYPHOPLASTY  T11, T12, L3 N/A 1/1/2019    Performed by Shaq Castro [facility] at [facility] OR    LAMINECTOMY, SPINE, MINIMALLY INVASIVE L3-4 decompression laminectomies/foraminotomies N/A 5/9/2019    Performed by  Josemanuel Keys MD at [facility] OR    LUMBAR SPINE SURGERY       Family History   Problem Relation Age of Onset    Heart disease Mother     Heart disease Father     Cancer Father     Hypertension Father     Stroke Father      Social History     Socioeconomic History    Marital status:      Spouse name: Not on file    Number of children: Not on file    Years of education: Not on file    Highest education level: Not on file   Occupational History    Not on file   Social Needs    Financial resource strain: Not on file    Food insecurity:     Worry: Not on file     Inability: Not on file    Transportation needs:     Medical: Not on file     Non-medical: Not on file   Tobacco Use    Smoking status: Former Smoker    Smokeless tobacco: Never Used   Substance and Sexual Activity    Alcohol use: No    Drug use: No    Sexual activity: Not on file   Lifestyle    Physical activity:     Days per week: Not on file     Minutes per session: Not on file    Stress: Not on file   Relationships    Social connections:     Talks on phone: Not on file     Gets together: Not on file     Attends Yazidism service: Not on file     Active member of club or organization: Not on file     Attends meetings of clubs or organizations: Not on file     Relationship status: Not on file   Other Topics Concern    Not on file   Social History Narrative    Not on file       Review of patient's allergies indicates:   Allergen Reactions    Lorazepam     Warfarin     Ciprofloxacin hcl Rash       Current Outpatient Medications:     aspirin 81 MG Chew, Take 1 tablet (81 mg total) by mouth once daily., Disp: , Rfl: 0    calcitonin, salmon, (FORTICAL) 200 unit/actuation nasal spray, 1 spray by Nasal route once daily., Disp: 1 Bottle, Rfl: 5    cyclobenzaprine (FLEXERIL) 10 MG tablet, Take 1 tablet (10 mg total) by mouth 3 (three) times daily as needed for Muscle spasms., Disp: 45 tablet, Rfl: 1    ergocalciferol (ERGOCALCIFEROL)  50,000 unit Cap, Take 1 capsule (50,000 Units total) by mouth every 7 days., Disp: 4 capsule, Rfl: 5    furosemide (LASIX) 20 MG tablet, TAKE 1 TABLET(20 MG) BY MOUTH TWICE DAILY, Disp: 180 tablet, Rfl: 0    gabapentin (NEURONTIN) 300 MG capsule, Take 2 capsules (600 mg total) by mouth 3 (three) times daily., Disp: 180 capsule, Rfl: 0    L.acidophil,parac-S.therm-Bif. (RISAQUAD) Cap capsule, Take 1 capsule by mouth once daily., Disp: , Rfl:     LETAIRIS 5 mg Tab, , Disp: , Rfl:     lisinopril 10 MG tablet, Take 1 tablet (10 mg total) by mouth once daily. (Patient taking differently: Take 10 mg by mouth daily as needed. ), Disp: 90 tablet, Rfl: 1    magnesium 30 mg Tab, Take by mouth once daily., Disp: , Rfl:     oxybutynin (DITROPAN-XL) 10 MG 24 hr tablet, Take 10 mg by mouth., Disp: , Rfl:     oxyCODONE-acetaminophen (PERCOCET) 7.5-325 mg per tablet, Take 1 tablet by mouth every 6 (six) hours as needed for Pain., Disp: 40 tablet, Rfl: 0    pantoprazole (PROTONIX) 40 MG tablet, Take 1 tablet (40 mg total) by mouth 2 (two) times daily., Disp: 60 tablet, Rfl: 11    potassium chloride (KLOR-CON) 10 MEQ TbSR, TAKE 1 TABLET(10 MEQ) BY MOUTH EVERY DAY, Disp: 90 tablet, Rfl: 1    pramipexole (MIRAPEX) 1 MG tablet, TAKE 1 TABLET BY MOUTH DAILY EVERY NIGHT AT BEDTIME FOR LEGS CRAMPS., Disp: , Rfl:     predniSONE (DELTASONE) 2.5 MG tablet, Take 1 tablet (2.5 mg total) by mouth 2 (two) times daily., Disp: 60 tablet, Rfl: 5    PROAIR RESPICLICK 90 mcg/actuation AePB, Inhale 1 puff into the lungs every 6 (six) hours as needed (for wheezing). , Disp: , Rfl:     rosuvastatin (CRESTOR) 20 MG tablet, ONE PO QHS, Disp: 90 tablet, Rfl: 1    sildenafil (REVATIO) 20 mg Tab, Take 20 mg by mouth 3 (three) times daily., Disp: , Rfl:     traZODone (DESYREL) 100 MG tablet, Take 100 mg by mouth every evening., Disp: , Rfl:     TRELEGY ELLIPTA 100-62.5-25 mcg DsDv, Inhale 1 puff into the lungs once daily. , Disp: , Rfl:      "XELJANZ XR 11 mg Tb24, , Disp: , Rfl:     metoprolol tartrate (LOPRESSOR) 50 MG tablet, Take 1 tablet (50 mg total) by mouth 3 (three) times daily., Disp: 270 tablet, Rfl: 1    Review of Systems   Constitution: Positive for malaise/fatigue. Negative for chills, decreased appetite, diaphoresis, fever and night sweats. Weight loss: SOME.   HENT: Negative for congestion and nosebleeds.    Eyes: Negative for blurred vision and visual disturbance.   Cardiovascular: Negative for chest pain, claudication, cyanosis, dyspnea on exertion (MILD), irregular heartbeat, leg swelling, near-syncope, orthopnea, palpitations, paroxysmal nocturnal dyspnea and syncope.   Respiratory: Negative for cough, hemoptysis, shortness of breath (BETTER) and wheezing (BETTER WITH INHALERS).    Endocrine: Negative for polyuria. Heat intolerance: ALL HIS LIFE.   Hematologic/Lymphatic: Negative for adenopathy and bleeding problem. Does not bruise/bleed easily.   Skin: Negative for color change, itching and nail changes.   Musculoskeletal: Negative for back pain and falls (R KNEE). Joint pain: KNEES. Muscle cramps: LEGS.   Gastrointestinal: Negative for abdominal pain, change in bowel habit, dysphagia, hematemesis, jaundice, melena and vomiting.   Genitourinary: Negative for dysuria, flank pain, frequency and hematuria.   Neurological: Negative for brief paralysis, dizziness, focal weakness, light-headedness, loss of balance (MILD), tremors and weakness.   Psychiatric/Behavioral: Negative for altered mental status, depression and memory loss.   Allergic/Immunologic: Negative for hives and persistent infections.        Objective:      Vitals:    09/04/19 1441   BP: 124/62   Pulse: 86   SpO2: 96%   Weight: 84.6 kg (186 lb 8.2 oz)   Height: 5' 7" (1.702 m)   PainSc: 0-No pain     Body mass index is 29.21 kg/m².    Physical Exam   Constitutional: He is oriented to person, place, and time. He appears well-developed and well-nourished. He is active. "   OVER WEIGHT   HENT:   Head: Normocephalic and atraumatic.   Mouth/Throat: Oropharynx is clear and moist and mucous membranes are normal.   Eyes: Pupils are equal, round, and reactive to light. Conjunctivae and EOM are normal.   Neck: Normal range of motion. Neck supple. Normal carotid pulses, no hepatojugular reflux and no JVD present. Carotid bruit is not present. No edema and no erythema present. No thyromegaly present.   Cardiovascular: Normal rate and regular rhythm.  No extrasystoles are present. PMI is not displaced. Exam reveals no gallop, no distant heart sounds, no friction rub and no midsystolic click.   Murmur heard.  High-pitched holosystolic murmur is present at the lower left sternal border and apex.  Pulses:       Carotid pulses are 2+ on the right side, and 2+ on the left side.       Radial pulses are 2+ on the right side, and 2+ on the left side.        Femoral pulses are 2+ on the right side, and 2+ on the left side.       Popliteal pulses are 2+ on the right side, and 2+ on the left side.        Dorsalis pedis pulses are 2+ on the right side, and 2+ on the left side.        Posterior tibial pulses are 2+ on the right side, and 2+ on the left side.   Pulmonary/Chest: Effort normal and breath sounds normal. No accessory muscle usage. No tachypnea and no bradypnea. No respiratory distress.   Abdominal: Soft. Bowel sounds are normal. He exhibits no distension and no mass. There is no hepatosplenomegaly. There is no CVA tenderness.   Musculoskeletal: Normal range of motion. He exhibits no edema or deformity.   SLOW GAIT,    Lymphadenopathy:     He has no cervical adenopathy.   Neurological: He is alert and oriented to person, place, and time. He has normal strength. He displays no tremor. No cranial nerve deficit ([place]).   Skin: Skin is warm and dry. No cyanosis or erythema. No pallor.   Psychiatric: He has a normal mood and affect. His speech is normal and behavior is normal.               ..     Chemistry        Component Value Date/Time     07/11/2019 1514     01/10/2019 1355    K 4.5 07/11/2019 1514     07/11/2019 1514     01/10/2019 1355    CO2 31 07/11/2019 1514    BUN 18 07/11/2019 1514    CREATININE 1.00 07/11/2019 1514    CREATININE 1.20 01/10/2019 1355     (H) 07/11/2019 1514     (H) 01/10/2019 1355        Component Value Date/Time    CALCIUM 9.8 07/11/2019 1514    ALKPHOS 92 07/11/2019 1514    AST 49 07/11/2019 1514    ALT 35 07/11/2019 1514    BILITOT 0.3 07/11/2019 1514    ESTGFRAFRICA >60 07/11/2019 1514    EGFRNONAA >60 07/11/2019 1514            ..  Lab Results   Component Value Date    CHOL 215 (H) 07/28/2017     Lab Results   Component Value Date    HDL 82 (H) 07/28/2017     Lab Results   Component Value Date    LDLCALC 106.0 07/28/2017     Lab Results   Component Value Date    TRIG 135 07/28/2017     Lab Results   Component Value Date    CHOLHDL 38.1 07/28/2017     ..  Lab Results   Component Value Date    WBC 9.74 07/11/2019    HGB 11.7 (L) 07/11/2019    HCT 37.9 (L) 07/11/2019     (H) 07/11/2019     07/11/2019       Test(s) Reviewed  I have reviewed the following in detail:  [] Stress test   [] Angiography   [x] Echocardiogram   [x] Labs   [] Other:       Assessment:         ICD-10-CM ICD-9-CM   1. Coronary artery disease of bypass graft of native heart with stable angina pectoris I25.708 414.05     413.9   2. Pulmonary HTN I27.20 416.8   3. Hypokalemia E87.6 276.8   4. Essential hypertension I10 401.9   5. Non-rheumatic mitral regurgitation I34.0 424.0   6. Hypercholesterolemia E78.00 272.0     Problem List Items Addressed This Visit        Pulmonary    Pulmonary HTN       Cardiac/Vascular    Coronary artery disease of bypass graft of native heart with stable angina pectoris - Primary    Essential hypertension    Non-rheumatic mitral regurgitation    Hypercholesterolemia       Renal/    Hypokalemia           Plan:     DAILY K,  EXPLAINED, ALL CV CLINICALLY STABLE, CLASS 1 ANGINA, NO HF, NO TIA, NO CLINICAL ARRHYTHMIA,CONTINUE CURRENT MEDS, EDUCATION, DIET, EXERCISE, RTC IN 6 MO       Coronary artery disease of bypass graft of native heart with stable angina pectoris    Pulmonary HTN    Hypokalemia    Essential hypertension    Non-rheumatic mitral regurgitation    Hypercholesterolemia  -     Cancel: Lipid panel; Future; Expected date: 09/04/2019    Other orders  -     rosuvastatin (CRESTOR) 20 MG tablet; ONE PO QHS  Dispense: 90 tablet; Refill: 1  -     potassium chloride (KLOR-CON) 10 MEQ TbSR; TAKE 1 TABLET(10 MEQ) BY MOUTH EVERY DAY  Dispense: 90 tablet; Refill: 1    RTC Low level/low impact aerobic exercise 5x's/wk. Heart healthy diet and risk factor modification.    See labs and med orders.    Aerobic exercise 5x's/wk. Heart healthy diet and risk factor modification.    See labs and med orders.

## 2019-09-12 ENCOUNTER — OFFICE VISIT (OUTPATIENT)
Dept: HEMATOLOGY/ONCOLOGY | Facility: CLINIC | Age: 74
End: 2019-09-12
Payer: MEDICARE

## 2019-09-12 VITALS
HEIGHT: 67 IN | DIASTOLIC BLOOD PRESSURE: 60 MMHG | RESPIRATION RATE: 20 BRPM | WEIGHT: 186.5 LBS | SYSTOLIC BLOOD PRESSURE: 133 MMHG | TEMPERATURE: 99 F | HEART RATE: 74 BPM | BODY MASS INDEX: 29.27 KG/M2 | OXYGEN SATURATION: 95 %

## 2019-09-12 DIAGNOSIS — E78.00 HYPERCHOLESTEROLEMIA: ICD-10-CM

## 2019-09-12 DIAGNOSIS — I48.0 PAF (PAROXYSMAL ATRIAL FIBRILLATION): ICD-10-CM

## 2019-09-12 DIAGNOSIS — D50.8 IRON DEFICIENCY ANEMIA SECONDARY TO INADEQUATE DIETARY IRON INTAKE: ICD-10-CM

## 2019-09-12 DIAGNOSIS — I25.10 CORONARY ARTERY DISEASE INVOLVING NATIVE CORONARY ARTERY OF NATIVE HEART WITHOUT ANGINA PECTORIS: Primary | ICD-10-CM

## 2019-09-12 PROCEDURE — 99215 OFFICE O/P EST HI 40 MIN: CPT | Mod: PBBFAC,PN | Performed by: INTERNAL MEDICINE

## 2019-09-12 PROCEDURE — 99214 PR OFFICE/OUTPT VISIT, EST, LEVL IV, 30-39 MIN: ICD-10-PCS | Mod: S$PBB,,, | Performed by: INTERNAL MEDICINE

## 2019-09-12 PROCEDURE — 99214 OFFICE O/P EST MOD 30 MIN: CPT | Mod: S$PBB,,, | Performed by: INTERNAL MEDICINE

## 2019-09-12 PROCEDURE — 99999 PR PBB SHADOW E&M-EST. PATIENT-LVL V: ICD-10-PCS | Mod: PBBFAC,,, | Performed by: INTERNAL MEDICINE

## 2019-09-12 PROCEDURE — 99999 PR PBB SHADOW E&M-EST. PATIENT-LVL V: CPT | Mod: PBBFAC,,, | Performed by: INTERNAL MEDICINE

## 2019-09-12 RX ORDER — FLUCONAZOLE 150 MG/1
TABLET ORAL
COMMUNITY
Start: 2019-07-31

## 2019-09-12 NOTE — PROGRESS NOTES
Subjective:       Patient ID: Jameel Villdea is a 74 y.o. male.    Chief Complaint: ref. By DR Lenard Aguero for evaluation of compression fracture   showing thoracic compression farctures.   had laminectomy in 12/2018 then multiple hospital stays with ventilatory issues . CAP, has ulmonary htn   pain is releived now but still occ sob from Phtn  Wasn't told anything about cancer while in hospital x 2 uptill 5.2019   here after macrocytosis w/u with his wife    HPI:     Social History     Socioeconomic History    Marital status:      Spouse name: Not on file    Number of children: Not on file    Years of education: Not on file    Highest education level: Not on file   Occupational History    Not on file   Social Needs    Financial resource strain: Not on file    Food insecurity:     Worry: Not on file     Inability: Not on file    Transportation needs:     Medical: Not on file     Non-medical: Not on file   Tobacco Use    Smoking status: Former Smoker    Smokeless tobacco: Never Used   Substance and Sexual Activity    Alcohol use: No    Drug use: No    Sexual activity: Not on file   Lifestyle    Physical activity:     Days per week: Not on file     Minutes per session: Not on file    Stress: Not on file   Relationships    Social connections:     Talks on phone: Not on file     Gets together: Not on file     Attends Scientologist service: Not on file     Active member of club or organization: Not on file     Attends meetings of clubs or organizations: Not on file     Relationship status: Not on file   Other Topics Concern    Not on file   Social History Narrative    Not on file     Family History   Problem Relation Age of Onset    Heart disease Mother     Heart disease Father     Cancer Father     Hypertension Father     Stroke Father      Past Surgical History:   Procedure Laterality Date    ANGIOGRAM, CORONARY, INCLUDING BYPASS GRAFT, WITH LEFT HEART CATHETERIZATION N/A 5/21/2019     [facility] CATH    CARDIAC CATHETERIZATION      Cardiac stents      cataracts Bilateral     CERVICAL SPINE SURGERY      CHOLECYSTECTOMY      Coronary angiogram and simultaneous LHC/RHC-RM # 432 Bilateral 5/21/2019    Per[facility] at [facility] CATH    CORONARY ANGIOPLASTY      CORONARY ARTERY BYPASS GRAFT  2001    EGD (ESOPHAGOGASTRODUODENOSCOPY) N/A 1/23/2019    [facility] ENDO    EGD (ESOPHAGOGASTRODUODENOSCOPY)-in ICU- on Drip N/A 11/20/2018    Performed by Lenin [facility]l [facility] at [facility] ENDO    EYE SURGERY      JOINT REPLACEMENT      both knees    knee replacements Bilateral     KYPHOPLASTY  T11, T12, L3 N/A 1/1/2019    Per[facility] by Shaq Castro [facility] at [facility] OR    LAMINECTOMY, SPINE, MINIMALLY INVASIVE L3-4 decompression laminectomies/foraminotomies N/A 5/9/2019    Performed by Josemanuel Keys [facility] at [facility] OR    LUMBAR SPINE SURGERY       Past Medical History:   Diagnosis Date    Acute coronary syndrome     Cancer     skin    CHF (congestive heart failure)     Coronary artery disease     Coronary artery disease involving coronary bypass graft of native heart 6/14/2017    Dyspnea     Heart murmur     Hyperlipidemia     Hypertension     Myofascial pain     PAF (paroxysmal atrial fibrillation)     PHT (pulmonary hypertension)     Polymyalgia rheumatica     Rheumatoid arthritis with rheumatoid factor of multiple sites without organ or systems involvement     Right rib fracture     Valvular regurgitation        Current Outpatient Medications:     aspirin 81 MG Chew, Take 1 tablet (81 mg total) by mouth once daily., Disp: , Rfl: 0    calcitonin, salmon, (FORTICAL) 200 unit/actuation nasal spray, 1 spray by Nasal route once daily., Disp: 1 Bottle, Rfl: 5    cyclobenzaprine (FLEXERIL) 10 MG tablet, Take 1 tablet (10 mg total) by mouth 3 (three) times daily as needed for Muscle spasms., Disp: 45 tablet, Rfl: 1     ergocalciferol (ERGOCALCIFEROL) 50,000 unit Cap, Take 1 capsule (50,000 Units total) by mouth every 7 days., Disp: 4 capsule, Rfl: 5    fluconazole (DIFLUCAN) 150 MG Tab, , Disp: , Rfl:     furosemide (LASIX) 20 MG tablet, TAKE 1 TABLET(20 MG) BY MOUTH TWICE DAILY, Disp: 180 tablet, Rfl: 0    L.acidophil,parac-S.therm-Bif. (RISAQUAD) Cap capsule, Take 1 capsule by mouth once daily., Disp: , Rfl:     LETAIRIS 5 mg Tab, , Disp: , Rfl:     lisinopril 10 MG tablet, Take 1 tablet (10 mg total) by mouth once daily. (Patient taking differently: Take 10 mg by mouth daily as needed. ), Disp: 90 tablet, Rfl: 1    magnesium 30 mg Tab, Take by mouth once daily., Disp: , Rfl:     metoprolol tartrate (LOPRESSOR) 50 MG tablet, Take 1 tablet (50 mg total) by mouth 3 (three) times daily., Disp: 270 tablet, Rfl: 1    oxybutynin (DITROPAN-XL) 10 MG 24 hr tablet, Take 10 mg by mouth., Disp: , Rfl:     oxyCODONE-acetaminophen (PERCOCET) 7.5-325 mg per tablet, Take 1 tablet by mouth every 6 (six) hours as needed for Pain., Disp: 40 tablet, Rfl: 0    pantoprazole (PROTONIX) 40 MG tablet, Take 1 tablet (40 mg total) by mouth 2 (two) times daily., Disp: 60 tablet, Rfl: 11    potassium chloride (KLOR-CON) 10 MEQ TbSR, TAKE 1 TABLET(10 MEQ) BY MOUTH EVERY DAY, Disp: 90 tablet, Rfl: 1    pramipexole (MIRAPEX) 1 MG tablet, TAKE 1 TABLET BY MOUTH DAILY EVERY NIGHT AT BEDTIME FOR LEGS CRAMPS., Disp: , Rfl:     predniSONE (DELTASONE) 2.5 MG tablet, Take 1 tablet (2.5 mg total) by mouth 2 (two) times daily., Disp: 60 tablet, Rfl: 5    PROAIR RESPICLICK 90 mcg/actuation AePB, Inhale 1 puff into the lungs every 6 (six) hours as needed (for wheezing). , Disp: , Rfl:     rosuvastatin (CRESTOR) 20 MG tablet, ONE PO QHS, Disp: 90 tablet, Rfl: 1    sildenafil (REVATIO) 20 mg Tab, Take 20 mg by mouth 3 (three) times daily., Disp: , Rfl:     traZODone (DESYREL) 100 MG tablet, Take 100 mg by mouth every evening., Disp: , Rfl:     TRELEGY  ELLIPTA 100-62.5-25 mcg DsDv, Inhale 1 puff into the lungs once daily. , Disp: , Rfl:     XELJANZ XR 11 mg Tb24, , Disp: , Rfl:     gabapentin (NEURONTIN) 300 MG capsule, Take 2 capsules (600 mg total) by mouth 3 (three) times daily., Disp: 180 capsule, Rfl: 0  Review of patient's allergies indicates:   Allergen Reactions    Lorazepam     Warfarin     Ciprofloxacin hcl Rash         REVIEW OF SYSTEMS:     CONSTITUTIONAL: The patient denies any weight change. There is no apparent    change in appetite, fever, night sweats, headaches, fatigue, dizziness, or    weakness.      SKIN: Denies rash, issues with nails, non-healing sores, bleeding, blotching    skin or abnormal bruising. Denies new moles or changes to existing moles.      BREASTS: There is no swelling around breasts or nipple discharge.    EYES: Denies eye pain, blurred vision, swelling, redness or discharge.      ENT AND MOUTH: Denies runny nose, stuffiness, sinus trouble or sores. Denies    nosebleeds. Denies, hoarseness, change in voice or swelling in front of the    neck.      CARDIOVASCULAR: Denies chest pain, discomfort or palpitations. Denies neck    swelling or episodes of passing out.      RESPIRATORY: sob+, bruce. On exertion. occ cough has not improved since receiving iron    GI: Denies trouble swallowing, indigestion, heartburn, abdominal pain, nausea,    vomiting, diarrhea, altered bowel habits, blood in stool, discoloration of    stools, change in nature of stool, bloating, increased abdominal girth.      GENITOURINARY: No discharge. No pelvic pain or lumps. No rash around groin or  lesions. No urinary frequency, hesitation, painful urination or blood in    urine. Denies incontinence. No problems with intercourse.      MUSCULOSKELETAL: Denies neck or back pain. Denies weakness in arms or legs,    joint problems or distended inflamed veins in legs. Denies swelling or abnormal  glands.      NEUROLOGICAL: Denies tingling, numbness, altered  mentation changes to nerve    function in the face, weakness to one or both of the body. Denies changes to    gait and denies multiple falls or accidents.        PHYSICAL EXAM:     Vitals:    09/12/19 1028   BP: 133/60   Pulse: 74   Resp: 20   Temp: 98.6 °F (37 °C)       GENERAL: Comfortable looking patient. Patient is in no distress. Significant work of breathing noted  Awake, alert and oriented to time, person and place.  No anxiety, or agitation.      HEENT: Normal conjunctivae and eyelids. WNL.  PERRLA 3 to 4 mm. No icterus, no pallor, no congestion, and no discharge noted.     NECK:  Supple. Trachea is central.  No crepitus.  No JVD or masses.    RESPIRATORY:  No intercostal retractions.  Accessory muscles of respirations+ has pulmonary hypertension No dullness to percussion.  Chest is clear to auscultation.  No rales, occasional rhonchi has wheezes.  No crepitus.  Good air entry bilaterally.    CARDIOVASCULAR:  S1 and S2 are normally heard without murmurs or gallops.  All peripheral pulses are present.    ABDOMEN:  Normal abdomen.  No hepatosplenomegaly.  No free fluid.  Bowel sounds are present.  No hernia noted. No masses.  No rebound or tenderness.  No guarding or rigidity.  Umbilicus is midline.    LYMPHATICS:  No axillary, cervical, supraclavicular, submental, or inguinal lymphadenopathy.    SKIN/MUSCULOSKELETAL:  There is no evidence of excoriation marks or ecchmosis.  No rashes.  No cyanosis.  No clubbing.  No joint or skeletal deformities noted.  Normal range of motion.    NEUROLOGIC:  Higher functions are appropriate.  No cranial nerve deficits.  Normal khang.  Normal strength.  Motor and sensory functions are normal.  Deep tendon reflexes are normal.    GENITAL/RECTAL:  Exams are deferred.      Laboratory:   On Care everywhere from [facility] reviewed  Most recent labs drawn September 2019 with ferritin at 9:00 p.m. hemoglobin at 13.4 marker improvement white count platelets normal SPEP  MAYURI and free light chain air assay is also evaluated and only shows polyclonal gammopathy which is related to rheumatoid arthritis    Assessment/Plan:       Macrocytic anemia   b12 , folate, haptoglobin : no abnormailty   no evidence of abn SPEP/ MAYURI  Compression fractures:  so far no indication of MM or smoldering myeloma or MGUS at this point  Had  KANU recd  2 doses of injectafer with excellent response labs at [facility] showed normalization of hemoglobin will follow in 3 months with CBC CMP iron studies   pt has multiple issues of pulmonary HTN follows with pulmonary, cardiac , meds. Anemia possibly multifactorial his respiratory status seems to be a big issue patient is very fatigued from work of breathing  His SPEP is elevated from rheumatoid arthritis for which she follows with .  He has polyclonal gammopathy which will be followed periodically   had a CT chest without contrast in may without evidience of malignancy.   he had a pulm nodule   cont pcp for all above mentioned medical issues/ meds and f./u   cont cad f/u   rtc 3 month after injectafer with cbc, cmp, iron and testosterone levels  I see no evidence of malignancies  including myeloma on the labs done at [facility] or myself  Advance Care Planning   Discussed at previous visit

## 2019-09-25 DIAGNOSIS — M81.0 OSTEOPOROSIS, UNSPECIFIED OSTEOPOROSIS TYPE, UNSPECIFIED PATHOLOGICAL FRACTURE PRESENCE: ICD-10-CM

## 2019-09-25 RX ORDER — CALCITONIN SALMON 200 [IU]/.09ML
SPRAY, METERED NASAL
Qty: 3.7 ML | Refills: 11 | Status: SHIPPED | OUTPATIENT
Start: 2019-09-25

## 2019-10-19 RX ORDER — TOFACITINIB 11 MG/1
TABLET, FILM COATED, EXTENDED RELEASE ORAL
Qty: 30 TABLET | Refills: 5 | Status: SHIPPED | OUTPATIENT
Start: 2019-10-19 | End: 2020-04-16

## 2022-08-22 NOTE — PROGRESS NOTES
Last Appointment:  7/1/2022  Future Appointments   Date Time Provider Rafi Colette   9/2/2022 11:00 AM SCHEDULE, [facility]   10/7/2022 11:00 AM MD JHONATHAN Hernandez [facility] Subjective:       Patient ID: Jameel Villeda is a 74 y.o. male.    Chief Complaint: ref. By DR Lenard Aguero for evaluation of compression fracture   showing thoracic compression farctures.   had laminectomy in 12/2018 then multiple hospital stays with ventilatory issues . CAP, has ulmonary htn   pain is releived now but still occ sob from Phtn  Wasn't told anything about cancer while in hospital x 2 uptill 5.2019   here after macrocytosis w/u with his wife    HPI:     Social History     Socioeconomic History    Marital status:      Spouse name: Not on file    Number of children: Not on file    Years of education: Not on file    Highest education level: Not on file   Occupational History    Not on file   Social Needs    Financial resource strain: Not on file    Food insecurity:     Worry: Not on file     Inability: Not on file    Transportation needs:     Medical: Not on file     Non-medical: Not on file   Tobacco Use    Smoking status: Former Smoker    Smokeless tobacco: Never Used   Substance and Sexual Activity    Alcohol use: No    Drug use: No    Sexual activity: Not on file   Lifestyle    Physical activity:     Days per week: Not on file     Minutes per session: Not on file    Stress: Not on file   Relationships    Social connections:     Talks on phone: Not on file     Gets together: Not on file     Attends Congregational service: Not on file     Active member of club or organization: Not on file     Attends meetings of clubs or organizations: Not on file     Relationship status: Not on file   Other Topics Concern    Not on file   Social History Narrative    Not on file     Family History   Problem Relation Age of Onset    Heart disease Mother     Heart disease Father     Cancer Father     Hypertension Father     Stroke Father      Past Surgical History:   Procedure Laterality Date    ANGIOGRAM, CORONARY, INCLUDING BYPASS GRAFT, WITH LEFT HEART CATHETERIZATION N/A 5/21/2019     Performed by [facility] at [facility] CATH    CARDIAC CATHETERIZATION      Cardiac stents      cataracts Bilateral     CERVICAL SPINE SURGERY      CHOLECYSTECTOMY      Coronary angiogram and simultaneous LHC/RHC-RM # 432 Bilateral 5/21/2019    Performed by [facility]raim [facility]o [facility] at [facility] CATH    CORONARY ANGIOPLASTY      CORONARY ARTERY BYPASS GRAFT  2001    EGD (ESOPHAGOGASTRODUODENOSCOPY) N/A 1/23/2019    Performed by [facility] ENDO    EGD (ESOPHAGOGASTRODUODENOSCOPY)-in ICU- on Drip N/A 11/20/2018    Performed by Lenin Morel MD at [facility] ENDO    EYE SURGERY      JOINT REPLACEMENT      both knees    knee replacements Bilateral     KYPHOPLASTY  T11, T12, L3 N/A 1/1/2019    Performed by Shaq Castro MD at [facility] OR    LAMINECTOMY, SPINE, MINIMALLY INVASIVE L3-4 decompression laminectomies/foraminotomies N/A 5/9/2019    Performed by Josemanuel Keys MD at [facility] OR    LUMBAR SPINE SURGERY       Past Medical History:   Diagnosis Date    Acute coronary syndrome     Cancer     skin    CHF (congestive heart failure)     Coronary artery disease     Coronary artery disease involving coronary bypass graft of native heart 6/14/2017    Dyspnea     Heart murmur     Hyperlipidemia     Hypertension     Myofascial pain     PAF (paroxysmal atrial fibrillation)     PHT (pulmonary hypertension)     Polymyalgia rheumatica     Rheumatoid arthritis with rheumatoid factor of multiple sites without organ or systems involvement     Right rib fracture     Valvular regurgitation        Current Outpatient Medications:     aspirin 81 MG Chew, Take 1 tablet (81 mg total) by mouth once daily., Disp: , Rfl: 0    benzonatate (TESSALON PERLES) 100 MG capsule, Take 1 capsule (100 mg total) by mouth 3 (three) times daily as needed for Cough., Disp: 30 capsule, Rfl: 1    calcitonin, salmon, (FORTICAL) 200 unit/actuation nasal spray, 1 spray by Nasal route once daily., Disp: 1 Bottle, Rfl: 5     cyclobenzaprine (FLEXERIL) 10 MG tablet, Take 1 tablet (10 mg total) by mouth 3 (three) times daily as needed for Muscle spasms., Disp: 45 tablet, Rfl: 1    ergocalciferol (ERGOCALCIFEROL) 50,000 unit Cap, Take 1 capsule (50,000 Units total) by mouth every 7 days., Disp: 4 capsule, Rfl: 5    furosemide (LASIX) 20 MG tablet, TAKE 1 TABLET(20 MG) BY MOUTH TWICE DAILY, Disp: 180 tablet, Rfl: 0    L.acidophil,parac-S.therm-Bif. (RISAQUAD) Cap capsule, Take 1 capsule by mouth once daily., Disp: , Rfl:     latanoprost 0.005 % ophthalmic solution, Place 1 drop into both eyes once daily. , Disp: , Rfl:     lisinopril 10 MG tablet, Take 1 tablet (10 mg total) by mouth once daily. (Patient taking differently: Take 10 mg by mouth daily as needed. ), Disp: 90 tablet, Rfl: 1    magnesium 30 mg Tab, Take by mouth once daily., Disp: , Rfl:     metoprolol tartrate (LOPRESSOR) 50 MG tablet, Take 1 tablet (50 mg total) by mouth 3 (three) times daily., Disp: 270 tablet, Rfl: 1    oxyCODONE-acetaminophen (PERCOCET) 7.5-325 mg per tablet, Take 1 tablet by mouth every 6 (six) hours as needed for Pain., Disp: 40 tablet, Rfl: 0    pantoprazole (PROTONIX) 40 MG tablet, Take 1 tablet (40 mg total) by mouth 2 (two) times daily., Disp: 60 tablet, Rfl: 11    potassium chloride (KLOR-CON) 10 MEQ TbSR, TAKE 1 TABLET(10 MEQ) BY MOUTH EVERY DAY, Disp: 90 tablet, Rfl: 1    pramipexole (MIRAPEX) 1 MG tablet, TAKE 1 TABLET BY MOUTH DAILY EVERY NIGHT AT BEDTIME FOR LEGS CRAMPS., Disp: , Rfl:     predniSONE (DELTASONE) 5 MG tablet, TAKE 1 TABLET BY MOUTH THREE TIMES DAILY, Disp: 90 tablet, Rfl: 1    PROAIR RESPICLICK 90 mcg/actuation AePB, Inhale 1 puff into the lungs every 6 (six) hours as needed (for wheezing). , Disp: , Rfl:     ranitidine (ZANTAC) 300 MG tablet, TAKE 1 TABLET(300 MG) BY MOUTH EVERY EVENING, Disp: 30 tablet, Rfl: 0    rosuvastatin (CRESTOR) 20 MG tablet, Take 20 mg by mouth every evening., Disp: , Rfl:      rosuvastatin (CRESTOR) 20 MG tablet, TAKE 1 TABLET(20 MG) BY MOUTH EVERY DAY, Disp: 90 tablet, Rfl: 0    traZODone (DESYREL) 100 MG tablet, Take 100 mg by mouth every evening., Disp: , Rfl:     TRELEGY ELLIPTA 100-62.5-25 mcg DsDv, Inhale 1 puff into the lungs once daily. , Disp: , Rfl:     gabapentin (NEURONTIN) 300 MG capsule, Take 2 capsules (600 mg total) by mouth 3 (three) times daily., Disp: 180 capsule, Rfl: 0  Review of patient's allergies indicates:   Allergen Reactions    Lorazepam     Warfarin     Ciprofloxacin hcl Rash         REVIEW OF SYSTEMS:     CONSTITUTIONAL: The patient denies any weight change. There is no apparent    change in appetite, fever, night sweats, headaches, fatigue, dizziness, or    weakness.      SKIN: Denies rash, issues with nails, non-healing sores, bleeding, blotching    skin or abnormal bruising. Denies new moles or changes to existing moles.      BREASTS: There is no swelling around breasts or nipple discharge.    EYES: Denies eye pain, blurred vision, swelling, redness or discharge.      ENT AND MOUTH: Denies runny nose, stuffiness, sinus trouble or sores. Denies    nosebleeds. Denies, hoarseness, change in voice or swelling in front of the    neck.      CARDIOVASCULAR: Denies chest pain, discomfort or palpitations. Denies neck    swelling or episodes of passing out.      RESPIRATORY: sob+, bruce. On exertion. occ cough    GI: Denies trouble swallowing, indigestion, heartburn, abdominal pain, nausea,    vomiting, diarrhea, altered bowel habits, blood in stool, discoloration of    stools, change in nature of stool, bloating, increased abdominal girth.      GENITOURINARY: No discharge. No pelvic pain or lumps. No rash around groin or  lesions. No urinary frequency, hesitation, painful urination or blood in    urine. Denies incontinence. No problems with intercourse.      MUSCULOSKELETAL: Denies neck or back pain. Denies weakness in arms or legs,    joint problems or  distended inflamed veins in legs. Denies swelling or abnormal  glands.      NEUROLOGICAL: Denies tingling, numbness, altered mentation changes to nerve    function in the face, weakness to one or both of the body. Denies changes to    gait and denies multiple falls or accidents.        PHYSICAL EXAM:     Vitals:    07/25/19 1001   BP: (!) 140/79   Pulse: 82   Resp: (!) 22   Temp: 98.4 °F (36.9 °C)       GENERAL: Comfortable looking patient. Patient is in no distress.  Awake, alert and oriented to time, person and place.  No anxiety, or agitation.      HEENT: Normal conjunctivae and eyelids. WNL.  PERRLA 3 to 4 mm. No icterus, no pallor, no congestion, and no discharge noted.     NECK:  Supple. Trachea is central.  No crepitus.  No JVD or masses.    RESPIRATORY:  No intercostal retractions.  No dullness to percussion.  Chest is clear to auscultation.  No rales, rhonchi or wheezes.  No crepitus.  Good air entry bilaterally.    CARDIOVASCULAR:  S1 and S2 are normally heard without murmurs or gallops.  All peripheral pulses are present.    ABDOMEN:  Normal abdomen.  No hepatosplenomegaly.  No free fluid.  Bowel sounds are present.  No hernia noted. No masses.  No rebound or tenderness.  No guarding or rigidity.  Umbilicus is midline.    LYMPHATICS:  No axillary, cervical, supraclavicular, submental, or inguinal lymphadenopathy.    SKIN/MUSCULOSKELETAL:  There is no evidence of excoriation marks or ecchmosis.  No rashes.  No cyanosis.  No clubbing.  No joint or skeletal deformities noted.  Normal range of motion.    NEUROLOGIC:  Higher functions are appropriate.  No cranial nerve deficits.  Normal khang.  Normal strength.  Motor and sensory functions are normal.  Deep tendon reflexes are normal.    GENITAL/RECTAL:  Exams are deferred.      Laboratory:     CBC:  Lab Results   Component Value Date    WBC 9.74 07/11/2019    RBC 3.77 (L) 07/11/2019    HGB 11.7 (L) 07/11/2019    HCT 37.9 (L) 07/11/2019     (H)  07/11/2019    MCH 31.0 07/11/2019    MCHC 30.9 (L) 07/11/2019    RDW 15.0 (H) 07/11/2019     07/11/2019    MPV 9.9 07/11/2019    GRAN 8.0 (H) 07/11/2019    GRAN 82.3 (H) 07/11/2019    LYMPH 0.8 (L) 07/11/2019    LYMPH 7.9 (L) 07/11/2019    MONO 0.8 07/11/2019    MONO 8.4 07/11/2019    EOS 0.1 07/11/2019    BASO 0.03 07/11/2019    EOSINOPHIL 0.5 07/11/2019    BASOPHIL 0.3 07/11/2019       BMP: BMP  Lab Results   Component Value Date     07/11/2019    K 4.5 07/11/2019     07/11/2019    CO2 31 07/11/2019    BUN 18 07/11/2019    CREATININE 1.00 07/11/2019    CALCIUM 9.8 07/11/2019    ANIONGAP 6 (L) 07/11/2019    ESTGFRAFRICA >60 07/11/2019    EGFRNONAA >60 07/11/2019       LFT:   Lab Results   Component Value Date    ALT 35 07/11/2019    AST 49 07/11/2019    ALKPHOS 92 07/11/2019    BILITOT 0.3 07/11/2019         Assessment/Plan:       Macrocytic anemia   b12 , folate, haptoglobin : no abnormailty   no evidence of abn SPEP/ MAYURI  Compression fractures:  so far no indication of MM  Has some KANU will give 2 doses of injectafer and see if there is any improvement if not bone marrow bx may be warranted   pt has multiple issues of pulmonary HTN follows with pulmonary, cardiac , meds. Anemia possibly multifactorial   had a CT chest without contrast in may without evidience of malignancy.   he had a pulm nodule   cont pcp for all above mentioned medical issues/ meds and f./u   cont cad f/u   rtc 1 month after injectafer with cbc, cmp, iron and testosterone levels  Advance Care Planning   Pt had advancd care planning and living will  completed at the hospital when he was intubated in the hospital and set on paper. He doesn't have a power of  set up yet and is motivated to engage in this soon

## 2022-09-05 NOTE — TELEPHONE ENCOUNTER
I called [facility] to let them know patient was to increase lasix for three days per .   Abnormal HR/Temp in hospital >= 38 C